# Patient Record
Sex: MALE | Race: BLACK OR AFRICAN AMERICAN | NOT HISPANIC OR LATINO | Employment: FULL TIME | URBAN - METROPOLITAN AREA
[De-identification: names, ages, dates, MRNs, and addresses within clinical notes are randomized per-mention and may not be internally consistent; named-entity substitution may affect disease eponyms.]

---

## 2021-07-29 DIAGNOSIS — U07.1 COVID-19 VIRUS DETECTED: ICD-10-CM

## 2021-08-01 ENCOUNTER — NURSE TRIAGE (OUTPATIENT)
Dept: ADMINISTRATIVE | Facility: CLINIC | Age: 31
End: 2021-08-01

## 2021-08-02 ENCOUNTER — NURSE TRIAGE (OUTPATIENT)
Dept: ADMINISTRATIVE | Facility: CLINIC | Age: 31
End: 2021-08-02

## 2021-08-03 ENCOUNTER — NURSE TRIAGE (OUTPATIENT)
Dept: ADMINISTRATIVE | Facility: CLINIC | Age: 31
End: 2021-08-03

## 2021-08-06 ENCOUNTER — NURSE TRIAGE (OUTPATIENT)
Dept: ADMINISTRATIVE | Facility: CLINIC | Age: 31
End: 2021-08-06

## 2021-08-07 ENCOUNTER — NURSE TRIAGE (OUTPATIENT)
Dept: ADMINISTRATIVE | Facility: CLINIC | Age: 31
End: 2021-08-07

## 2021-08-08 ENCOUNTER — NURSE TRIAGE (OUTPATIENT)
Dept: ADMINISTRATIVE | Facility: CLINIC | Age: 31
End: 2021-08-08

## 2021-08-09 ENCOUNTER — NURSE TRIAGE (OUTPATIENT)
Dept: ADMINISTRATIVE | Facility: CLINIC | Age: 31
End: 2021-08-09

## 2022-09-14 ENCOUNTER — OFFICE VISIT (OUTPATIENT)
Dept: UROLOGY | Facility: CLINIC | Age: 32
End: 2022-09-14
Payer: COMMERCIAL

## 2022-09-14 VITALS
HEIGHT: 67 IN | DIASTOLIC BLOOD PRESSURE: 79 MMHG | BODY MASS INDEX: 25.01 KG/M2 | SYSTOLIC BLOOD PRESSURE: 125 MMHG | WEIGHT: 159.38 LBS | HEART RATE: 68 BPM

## 2022-09-14 DIAGNOSIS — N50.0 ATROPHY OF TESTIS: ICD-10-CM

## 2022-09-14 DIAGNOSIS — Z76.89 ESTABLISHING CARE WITH NEW DOCTOR, ENCOUNTER FOR: Primary | ICD-10-CM

## 2022-09-14 LAB
BILIRUB SERPL-MCNC: NORMAL MG/DL
BLOOD URINE, POC: NORMAL
CLARITY, POC UA: CLEAR
COLOR, POC UA: NORMAL
GLUCOSE UR QL STRIP: NORMAL
KETONES UR QL STRIP: NORMAL
LEUKOCYTE ESTERASE URINE, POC: NORMAL
NITRITE, POC UA: NORMAL
PH, POC UA: 7
PROTEIN, POC: NORMAL
SPECIFIC GRAVITY, POC UA: 1.02
UROBILINOGEN, POC UA: NORMAL

## 2022-09-14 PROCEDURE — 99999 PR PBB SHADOW E&M-EST. PATIENT-LVL III: ICD-10-PCS | Mod: PBBFAC,,, | Performed by: NURSE PRACTITIONER

## 2022-09-14 PROCEDURE — 99204 PR OFFICE/OUTPT VISIT, NEW, LEVL IV, 45-59 MIN: ICD-10-PCS | Mod: S$GLB,,, | Performed by: NURSE PRACTITIONER

## 2022-09-14 PROCEDURE — 1160F RVW MEDS BY RX/DR IN RCRD: CPT | Mod: CPTII,S$GLB,, | Performed by: NURSE PRACTITIONER

## 2022-09-14 PROCEDURE — 3074F PR MOST RECENT SYSTOLIC BLOOD PRESSURE < 130 MM HG: ICD-10-PCS | Mod: CPTII,S$GLB,, | Performed by: NURSE PRACTITIONER

## 2022-09-14 PROCEDURE — 81002 URINALYSIS NONAUTO W/O SCOPE: CPT | Mod: S$GLB,,, | Performed by: NURSE PRACTITIONER

## 2022-09-14 PROCEDURE — 3078F DIAST BP <80 MM HG: CPT | Mod: CPTII,S$GLB,, | Performed by: NURSE PRACTITIONER

## 2022-09-14 PROCEDURE — 3008F BODY MASS INDEX DOCD: CPT | Mod: CPTII,S$GLB,, | Performed by: NURSE PRACTITIONER

## 2022-09-14 PROCEDURE — 99204 OFFICE O/P NEW MOD 45 MIN: CPT | Mod: S$GLB,,, | Performed by: NURSE PRACTITIONER

## 2022-09-14 PROCEDURE — 1160F PR REVIEW ALL MEDS BY PRESCRIBER/CLIN PHARMACIST DOCUMENTED: ICD-10-PCS | Mod: CPTII,S$GLB,, | Performed by: NURSE PRACTITIONER

## 2022-09-14 PROCEDURE — 3074F SYST BP LT 130 MM HG: CPT | Mod: CPTII,S$GLB,, | Performed by: NURSE PRACTITIONER

## 2022-09-14 PROCEDURE — 81002 POCT URINE DIPSTICK WITHOUT MICROSCOPE: ICD-10-PCS | Mod: S$GLB,,, | Performed by: NURSE PRACTITIONER

## 2022-09-14 PROCEDURE — 1159F MED LIST DOCD IN RCRD: CPT | Mod: CPTII,S$GLB,, | Performed by: NURSE PRACTITIONER

## 2022-09-14 PROCEDURE — 3008F PR BODY MASS INDEX (BMI) DOCUMENTED: ICD-10-PCS | Mod: CPTII,S$GLB,, | Performed by: NURSE PRACTITIONER

## 2022-09-14 PROCEDURE — 99999 PR PBB SHADOW E&M-EST. PATIENT-LVL III: CPT | Mod: PBBFAC,,, | Performed by: NURSE PRACTITIONER

## 2022-09-14 PROCEDURE — 1159F PR MEDICATION LIST DOCUMENTED IN MEDICAL RECORD: ICD-10-PCS | Mod: CPTII,S$GLB,, | Performed by: NURSE PRACTITIONER

## 2022-09-14 PROCEDURE — 3078F PR MOST RECENT DIASTOLIC BLOOD PRESSURE < 80 MM HG: ICD-10-PCS | Mod: CPTII,S$GLB,, | Performed by: NURSE PRACTITIONER

## 2022-09-14 RX ORDER — TADALAFIL 5 MG/1
5 TABLET ORAL DAILY
Qty: 30 TABLET | Refills: 12 | Status: SHIPPED | OUTPATIENT
Start: 2022-09-14 | End: 2024-01-31 | Stop reason: SDUPTHER

## 2022-09-14 NOTE — PROGRESS NOTES
"[facility] Note  Staff: ANNIE Borrero-C    PCP: MD Rosa    Chief Complaint: Atrophy of testis    Subjective:        HPI: Tomi Herrera is a 32 y.o. male NEW PT presents today for evaluation of treatment of small testicles.    Pt states he was initially seeing Dr. Pablo Gutierrez in [place], [place] for treatment of "small testicles" with daily Cialis 5 mg.  But since last ov, MD has retired and pt currently looking for another MD at this time for his refills.    [facility]-primary care    STDs in past: No  Vasectomy: none  No hx of gross hematuria  No hx of blood in semen.    No Family hx of  cancers at this time.     JEAN CARLOS Score of 16 today:  Mild-moderate ED  1.3  2.3  3.2  4.4  5.4    REVIEW OF SYSTEMS:  A comprehensive 10 system review was performed and is negative except as noted above in HPI    PMHx:  History reviewed. No pertinent past medical history.  Kidney stones: No  Wears glasses    PSHx:  History reviewed. No pertinent surgical history.    Allergies:  Patient has no known allergies.    Medications: reviewed   Objective:     Vitals:    09/14/22 1410   BP: 125/79   Pulse: 68     General:WDWN in NAD  Eyes: PERRLA, normal conjunctiva  Respiratory: no increased work on breathing, clear to auscultation  Cardiovascular: regular rate and rhythm. No obvious extremity edema.  GI: palpation of masses. No tenderness. No hepatosplenomegaly to palpation.  Musculoskeletal: normal range of motion of bilateral upper extremities. Normal muscle strength and tone.  Skin: no obvious rashes or lesions. No tightening of skin noted.  Neurologic: CN grossly normal. Normal sensation.   Psychiatric: awake, alert and oriented x 3. Mood and affect normal. Cooperative.     Exam performed by me during ov today:  No scrotal rashes, cysts or lesions  Epididymis normal in size, no tenderness  Atrophy of Testes observed, descended normal and size, equal size bilaterally, no masses  Urethral " meatus normal without discharge  Penis is not circumcised,      Assessment:       1. Establishing care with new doctor, encounter for    2. Atrophy of testis          Plan:     Records Release form signed and faxed to former Dr. Pablo Gutierrez's office to obtain past medical records for review at this time.    Cialis 5 mg refilled to pt today, but to only take as needed.    F/UP--once we receive and review pt's previous records related to his  issues, we will contact him regarding a f/up appointment.  If we are unable to obtain records from previous MD, then RTC in one year for recheck.      Chloe Rai, JENNIFERP-C

## 2023-08-30 ENCOUNTER — OFFICE VISIT (OUTPATIENT)
Dept: FAMILY MEDICINE | Facility: CLINIC | Age: 33
End: 2023-08-30
Payer: COMMERCIAL

## 2023-08-30 ENCOUNTER — LAB VISIT (OUTPATIENT)
Dept: LAB | Facility: HOSPITAL | Age: 33
End: 2023-08-30
Attending: FAMILY MEDICINE
Payer: COMMERCIAL

## 2023-08-30 VITALS
HEART RATE: 85 BPM | OXYGEN SATURATION: 99 % | TEMPERATURE: 98 F | RESPIRATION RATE: 18 BRPM | BODY MASS INDEX: 24.8 KG/M2 | DIASTOLIC BLOOD PRESSURE: 74 MMHG | SYSTOLIC BLOOD PRESSURE: 110 MMHG | WEIGHT: 158 LBS | HEIGHT: 67 IN

## 2023-08-30 DIAGNOSIS — Z00.00 PREVENTATIVE HEALTH CARE: ICD-10-CM

## 2023-08-30 DIAGNOSIS — L73.9 FOLLICULITIS: Primary | ICD-10-CM

## 2023-08-30 DIAGNOSIS — R06.02 SOB (SHORTNESS OF BREATH): ICD-10-CM

## 2023-08-30 DIAGNOSIS — I51.7 ATRIAL ENLARGEMENT, LEFT: ICD-10-CM

## 2023-08-30 LAB
ALBUMIN SERPL BCP-MCNC: 4.3 G/DL (ref 3.5–5.2)
ALP SERPL-CCNC: 47 U/L (ref 55–135)
ALT SERPL W/O P-5'-P-CCNC: 24 U/L (ref 10–44)
ANION GAP SERPL CALC-SCNC: 7 MMOL/L (ref 8–16)
AST SERPL-CCNC: 26 U/L (ref 10–40)
BASOPHILS # BLD AUTO: 0.04 K/UL (ref 0–0.2)
BASOPHILS NFR BLD: 0.9 % (ref 0–1.9)
BILIRUB SERPL-MCNC: 0.8 MG/DL (ref 0.1–1)
BUN SERPL-MCNC: 14 MG/DL (ref 6–20)
CALCIUM SERPL-MCNC: 9.1 MG/DL (ref 8.7–10.5)
CHLORIDE SERPL-SCNC: 104 MMOL/L (ref 95–110)
CHOLEST SERPL-MCNC: 179 MG/DL (ref 120–199)
CHOLEST/HDLC SERPL: 3.4 {RATIO} (ref 2–5)
CO2 SERPL-SCNC: 26 MMOL/L (ref 23–29)
CREAT SERPL-MCNC: 1 MG/DL (ref 0.5–1.4)
DIFFERENTIAL METHOD: ABNORMAL
EOSINOPHIL # BLD AUTO: 0.1 K/UL (ref 0–0.5)
EOSINOPHIL NFR BLD: 2.4 % (ref 0–8)
ERYTHROCYTE [DISTWIDTH] IN BLOOD BY AUTOMATED COUNT: 12.2 % (ref 11.5–14.5)
EST. GFR  (NO RACE VARIABLE): >60 ML/MIN/1.73 M^2
GLUCOSE SERPL-MCNC: 85 MG/DL (ref 70–110)
HCT VFR BLD AUTO: 46.4 % (ref 40–54)
HDLC SERPL-MCNC: 52 MG/DL (ref 40–75)
HDLC SERPL: 29.1 % (ref 20–50)
HGB BLD-MCNC: 14.8 G/DL (ref 14–18)
IMM GRANULOCYTES # BLD AUTO: 0.01 K/UL (ref 0–0.04)
IMM GRANULOCYTES NFR BLD AUTO: 0.2 % (ref 0–0.5)
LDLC SERPL CALC-MCNC: 120.8 MG/DL (ref 63–159)
LYMPHOCYTES # BLD AUTO: 2 K/UL (ref 1–4.8)
LYMPHOCYTES NFR BLD: 42 % (ref 18–48)
MCH RBC QN AUTO: 28.4 PG (ref 27–31)
MCHC RBC AUTO-ENTMCNC: 31.9 G/DL (ref 32–36)
MCV RBC AUTO: 89 FL (ref 82–98)
MONOCYTES # BLD AUTO: 0.4 K/UL (ref 0.3–1)
MONOCYTES NFR BLD: 8.6 % (ref 4–15)
NEUTROPHILS # BLD AUTO: 2.2 K/UL (ref 1.8–7.7)
NEUTROPHILS NFR BLD: 45.9 % (ref 38–73)
NONHDLC SERPL-MCNC: 127 MG/DL
NRBC BLD-RTO: 0 /100 WBC
PLATELET # BLD AUTO: 251 K/UL (ref 150–450)
PMV BLD AUTO: 10.9 FL (ref 9.2–12.9)
POTASSIUM SERPL-SCNC: 3.9 MMOL/L (ref 3.5–5.1)
PROT SERPL-MCNC: 7.8 G/DL (ref 6–8.4)
RBC # BLD AUTO: 5.21 M/UL (ref 4.6–6.2)
SODIUM SERPL-SCNC: 137 MMOL/L (ref 136–145)
TRIGL SERPL-MCNC: 31 MG/DL (ref 30–150)
WBC # BLD AUTO: 4.67 K/UL (ref 3.9–12.7)

## 2023-08-30 PROCEDURE — 1159F PR MEDICATION LIST DOCUMENTED IN MEDICAL RECORD: ICD-10-PCS | Mod: CPTII,S$GLB,, | Performed by: FAMILY MEDICINE

## 2023-08-30 PROCEDURE — 3074F SYST BP LT 130 MM HG: CPT | Mod: CPTII,S$GLB,, | Performed by: FAMILY MEDICINE

## 2023-08-30 PROCEDURE — 3008F BODY MASS INDEX DOCD: CPT | Mod: CPTII,S$GLB,, | Performed by: FAMILY MEDICINE

## 2023-08-30 PROCEDURE — 99214 OFFICE O/P EST MOD 30 MIN: CPT | Mod: S$GLB,,, | Performed by: FAMILY MEDICINE

## 2023-08-30 PROCEDURE — 99214 PR OFFICE/OUTPT VISIT, EST, LEVL IV, 30-39 MIN: ICD-10-PCS | Mod: S$GLB,,, | Performed by: FAMILY MEDICINE

## 2023-08-30 PROCEDURE — 36415 COLL VENOUS BLD VENIPUNCTURE: CPT | Performed by: FAMILY MEDICINE

## 2023-08-30 PROCEDURE — 86803 HEPATITIS C AB TEST: CPT | Performed by: FAMILY MEDICINE

## 2023-08-30 PROCEDURE — 80061 LIPID PANEL: CPT | Performed by: FAMILY MEDICINE

## 2023-08-30 PROCEDURE — 3074F PR MOST RECENT SYSTOLIC BLOOD PRESSURE < 130 MM HG: ICD-10-PCS | Mod: CPTII,S$GLB,, | Performed by: FAMILY MEDICINE

## 2023-08-30 PROCEDURE — 80053 COMPREHEN METABOLIC PANEL: CPT | Performed by: FAMILY MEDICINE

## 2023-08-30 PROCEDURE — 3008F PR BODY MASS INDEX (BMI) DOCUMENTED: ICD-10-PCS | Mod: CPTII,S$GLB,, | Performed by: FAMILY MEDICINE

## 2023-08-30 PROCEDURE — 87389 HIV-1 AG W/HIV-1&-2 AB AG IA: CPT | Performed by: FAMILY MEDICINE

## 2023-08-30 PROCEDURE — 1159F MED LIST DOCD IN RCRD: CPT | Mod: CPTII,S$GLB,, | Performed by: FAMILY MEDICINE

## 2023-08-30 PROCEDURE — 85025 COMPLETE CBC W/AUTO DIFF WBC: CPT | Performed by: FAMILY MEDICINE

## 2023-08-30 PROCEDURE — 3078F DIAST BP <80 MM HG: CPT | Mod: CPTII,S$GLB,, | Performed by: FAMILY MEDICINE

## 2023-08-30 PROCEDURE — 3078F PR MOST RECENT DIASTOLIC BLOOD PRESSURE < 80 MM HG: ICD-10-PCS | Mod: CPTII,S$GLB,, | Performed by: FAMILY MEDICINE

## 2023-08-30 RX ORDER — MUPIROCIN 20 MG/G
OINTMENT TOPICAL 3 TIMES DAILY
Qty: 30 G | Refills: 2 | Status: SHIPPED | OUTPATIENT
Start: 2023-08-30

## 2023-08-30 NOTE — PROGRESS NOTES
Subjective:       Patient ID: Tomi Herrera is a 33 y.o. male.    Chief Complaint: Establish Care (Immature granulocytes high)      Patient comes in today for consultation with abnormal labs.  Patient has been having some tenderness in lumps in the right lower quadrant.  Patient had concerns about L elevated immature granulocytes was reviewed his entire labs and showed that this was an inflammatory or stress response and not to worry this was done in late April when the allergies were very thick.  Will repeat a CBC to confirm          Allergies and Medications:   Review of patient's allergies indicates:  No Known Allergies  Current Outpatient Medications   Medication Sig Dispense Refill    tadalafiL (CIALIS) 5 MG tablet Take 1 tablet (5 mg total) by mouth once daily. 30 tablet 12    aluminum & magnesium hydroxide-simethicone (MYLANTA MAXIMUM STRENGTH) 400-400-40 mg/5 mL suspension Take 15 mLs by mouth every 6 (six) hours as needed for Indigestion. (Patient not taking: Reported on 8/30/2023) 100 mL 0    famotidine (PEPCID) 20 MG tablet Take 1 tablet (20 mg total) by mouth 2 (two) times daily. for 14 days (Patient not taking: Reported on 8/30/2023) 28 tablet 0    mupirocin (BACTROBAN) 2 % ointment Apply topically 3 (three) times daily. 30 g 2    ondansetron (ZOFRAN-ODT) 4 MG TbDL Take 1-2 tablets (4-8 mg total) by mouth every 8 (eight) hours as needed (Nausea/Vomiting). (Patient not taking: Reported on 8/30/2023) 20 tablet 0    pantoprazole (PROTONIX) 20 MG tablet Take 1 tablet (20 mg total) by mouth 2 (two) times daily before meals. for 14 days (Patient not taking: Reported on 8/30/2023) 28 tablet 0     No current facility-administered medications for this visit.       Family History:   History reviewed. No pertinent family history.    Social History:   Social History     Socioeconomic History    Marital status: Single   Tobacco Use    Smoking status: Never    Smokeless tobacco: Never   Substance and Sexual Activity     Alcohol use: Yes     Comment: occ    Drug use: No    Sexual activity: Yes     Partners: Female       Review of Systems   Respiratory:  Positive for shortness of breath (Even when awakening at night). Negative for apnea, cough, choking, chest tightness, wheezing and stridor.        Objective:     Vitals:    08/30/23 0827   BP: 110/74   Pulse: 85   Resp: 18   Temp: 98.4 °F (36.9 °C)        Physical Exam  Vitals and nursing note reviewed.   Constitutional:       Appearance: He is well-developed. He is not diaphoretic.   HENT:      Head: Normocephalic.   Eyes:      Conjunctiva/sclera: Conjunctivae normal.      Pupils: Pupils are equal, round, and reactive to light.   Cardiovascular:      Rate and Rhythm: Normal rate and regular rhythm.      Heart sounds: Murmur (grade 2 of 6 mid systolic murmur at the left sternal border.) heard.      No friction rub. No gallop.   Pulmonary:      Effort: Pulmonary effort is normal. No respiratory distress.      Breath sounds: Normal breath sounds. No stridor. No wheezing, rhonchi or rales.   Chest:      Chest wall: No tenderness.   Abdominal:      Hernia: There is no hernia in the left inguinal area or right inguinal area.   Genitourinary:     Pubic Area: No rash or pubic lice.       Penis: Circumcised. No phimosis, paraphimosis, hypospadias, erythema, tenderness, discharge, swelling or lesions.       Testes: Normal. Cremasteric reflex is present.         Right: Mass, tenderness, swelling, testicular hydrocele or varicocele not present. Right testis is descended. Cremasteric reflex is present.          Left: Mass, tenderness, swelling, testicular hydrocele or varicocele not present. Left testis is descended. Cremasteric reflex is present.       Epididymis:      Right: Normal. Not inflamed or enlarged. No mass or tenderness.      Left: Normal. Not inflamed or enlarged. No mass or tenderness.      Sathish stage (genital): 5.   Musculoskeletal:         General: No swelling, tenderness or  deformity.      Right lower leg: No edema.   Lymphadenopathy:      Lower Body: No right inguinal adenopathy. No left inguinal adenopathy.   Skin:     General: Skin is warm and dry.   Psychiatric:         Behavior: Behavior normal.         Thought Content: Thought content normal.         Judgment: Judgment normal.       EKG from earlier this year shows some left atrial enlargement and early repolarization pattern probably a normal variant.  Assessment:       1. Folliculitis    2. Preventative health care    3. Atrial enlargement, left    4. SOB (shortness of breath)        Plan:       Tomi was seen today for establish care.    Diagnoses and all orders for this visit:    Folliculitis  -     mupirocin (BACTROBAN) 2 % ointment; Apply topically 3 (three) times daily.    Preventative health care- patient is overdue for routine care  -     Hepatitis C Antibody; Future  -     HIV 1/2 Ag/Ab (4th Gen) w/Refl; Future  -     CBC auto differential; Future  -     Lipid Panel; Future  -     Comprehensive Metabolic Panel; Future    Atrial enlargement, left-comparing his old EKGs this appears to be chronic and not a new finding the new finding was only under computer interpretation.  -     Echo; Future  -     Exercise Stress - EKG; Future    SOB (shortness of breath) this can occur with exertion or at rest needs evaluation  -     Exercise Stress - EKG; Future         Follow up in about 1 month (around 9/30/2023) for annual.

## 2023-08-31 LAB
HCV AB S/CO SERPL IA: NON REACTIVE
HIV 1+2 AB+HIV1 P24 AG SERPL QL IA: NON REACTIVE

## 2023-09-11 ENCOUNTER — PATIENT MESSAGE (OUTPATIENT)
Dept: FAMILY MEDICINE | Facility: CLINIC | Age: 33
End: 2023-09-11

## 2023-09-12 ENCOUNTER — TELEPHONE (OUTPATIENT)
Dept: FAMILY MEDICINE | Facility: CLINIC | Age: 33
End: 2023-09-12

## 2023-09-12 NOTE — TELEPHONE ENCOUNTER
Patient message    Hey Mr riddle I was looking at my recent ecg and I saw baseline wander limit interpretation whats the meaning of that does that mean the ecg wasnt accurate?

## 2023-09-14 ENCOUNTER — PATIENT MESSAGE (OUTPATIENT)
Dept: FAMILY MEDICINE | Facility: CLINIC | Age: 33
End: 2023-09-14

## 2023-09-14 ENCOUNTER — TELEPHONE (OUTPATIENT)
Dept: FAMILY MEDICINE | Facility: CLINIC | Age: 33
End: 2023-09-14

## 2023-09-15 ENCOUNTER — TELEPHONE (OUTPATIENT)
Dept: CARDIOLOGY | Facility: HOSPITAL | Age: 33
End: 2023-09-15

## 2023-09-15 NOTE — TELEPHONE ENCOUNTER
Patient advised, test will be at [facility] ([address] [facility]).   Will need to register on the first floor at the main entrance.   Patient advised that arrival time is 9:15am.  Patient advised, may take his medications prior to testing if you need to.  Advised if he needs to eat to take his medications, please keep it light, like toast and juice.    Patient advised to avoid all caffeine 12 hours prior to testing.  This includes decaf tea and coffee.    Wear comfortable clothing.   No lotions, oils, or powders to the upper chest area. May wear deodorant.    Patient verbalizes understanding of instructions.

## 2023-09-18 ENCOUNTER — HOSPITAL ENCOUNTER (OUTPATIENT)
Dept: CARDIOLOGY | Facility: HOSPITAL | Age: 33
Discharge: HOME | End: 2023-09-18
Attending: FAMILY MEDICINE
Payer: COMMERCIAL

## 2023-09-18 DIAGNOSIS — R06.02 SOB (SHORTNESS OF BREATH): ICD-10-CM

## 2023-09-18 DIAGNOSIS — I51.7 ATRIAL ENLARGEMENT, LEFT: ICD-10-CM

## 2023-09-18 LAB
CV STRESS BASE HR: 92 BPM
DIASTOLIC BLOOD PRESSURE: 88 MMHG
OHS CV CPX 1 MINUTE RECOVERY HEART RATE: 114 BPM
OHS CV CPX 85 PERCENT MAX PREDICTED HEART RATE MALE: 161
OHS CV CPX ESTIMATED METS: 12
OHS CV CPX MAX PREDICTED HEART RATE: 189
OHS CV CPX PATIENT IS FEMALE: 0
OHS CV CPX PATIENT IS MALE: 1
OHS CV CPX PEAK DIASTOLIC BLOOD PRESSURE: 88 MMHG
OHS CV CPX PEAK HEAR RATE: 161 BPM
OHS CV CPX PEAK RATE PRESSURE PRODUCT: NORMAL
OHS CV CPX PEAK SYSTOLIC BLOOD PRESSURE: 162 MMHG
OHS CV CPX PERCENT MAX PREDICTED HEART RATE ACHIEVED: 85
OHS CV CPX RATE PRESSURE PRODUCT PRESENTING: NORMAL
STRESS ECHO POST EXERCISE DUR MIN: 11 MINUTES
STRESS ECHO POST EXERCISE DUR SEC: 23 SECONDS
SYSTOLIC BLOOD PRESSURE: 134 MMHG

## 2023-09-18 PROCEDURE — 93016 EXERCISE STRESS - EKG (CUPID ONLY): ICD-10-PCS | Mod: ,,, | Performed by: NURSE PRACTITIONER

## 2023-09-18 PROCEDURE — 93018 EXERCISE STRESS - EKG (CUPID ONLY): ICD-10-PCS | Mod: ,,, | Performed by: INTERNAL MEDICINE

## 2023-09-18 PROCEDURE — 93016 CV STRESS TEST SUPVJ ONLY: CPT | Mod: ,,, | Performed by: NURSE PRACTITIONER

## 2023-09-18 PROCEDURE — 93018 CV STRESS TEST I&R ONLY: CPT | Mod: ,,, | Performed by: INTERNAL MEDICINE

## 2023-09-18 PROCEDURE — 93017 CV STRESS TEST TRACING ONLY: CPT

## 2023-09-19 ENCOUNTER — PATIENT MESSAGE (OUTPATIENT)
Dept: FAMILY MEDICINE | Facility: CLINIC | Age: 33
End: 2023-09-19

## 2023-09-19 ENCOUNTER — TELEPHONE (OUTPATIENT)
Dept: FAMILY MEDICINE | Facility: CLINIC | Age: 33
End: 2023-09-19

## 2023-09-19 NOTE — TELEPHONE ENCOUNTER
----- Message from Jaguar Voss MD sent at 9/18/2023  4:09 PM CDT -----  Stress test is negative return to clinic for follow-up as scheduled.

## 2023-10-09 ENCOUNTER — HOSPITAL ENCOUNTER (OUTPATIENT)
Dept: CARDIOLOGY | Facility: HOSPITAL | Age: 33
Discharge: HOME | End: 2023-10-09
Attending: FAMILY MEDICINE
Payer: COMMERCIAL

## 2023-10-09 VITALS — HEIGHT: 67 IN | BODY MASS INDEX: 24.8 KG/M2 | WEIGHT: 158 LBS

## 2023-10-09 DIAGNOSIS — I51.7 ATRIAL ENLARGEMENT, LEFT: ICD-10-CM

## 2023-10-09 LAB
AORTIC ROOT ANNULUS: 3.1 CM
AORTIC VALVE CUSP SEPERATION: 2.1 CM
AV INDEX (PROSTH): 0.88
AV MEAN GRADIENT: 4 MMHG
AV PEAK GRADIENT: 9 MMHG
AV VALVE AREA BY VELOCITY RATIO: 2.42 CM²
AV VALVE AREA: 2.78 CM²
AV VELOCITY RATIO: 0.77
BSA FOR ECHO PROCEDURE: 1.84 M2
CV ECHO LV RWT: 0.36 CM
DOP CALC AO PEAK VEL: 1.48 M/S
DOP CALC AO VTI: 30.2 CM
DOP CALC LVOT AREA: 3.1 CM2
DOP CALC LVOT DIAMETER: 2 CM
DOP CALC LVOT PEAK VEL: 1.14 M/S
DOP CALC LVOT STROKE VOLUME: 83.84 CM3
DOP CALCLVOT PEAK VEL VTI: 26.7 CM
E WAVE DECELERATION TIME: 153 MSEC
E/A RATIO: 1.91
E/E' RATIO: 6.97 M/S
ECHO LV POSTERIOR WALL: 0.89 CM (ref 0.6–1.1)
FRACTIONAL SHORTENING: 34 % (ref 28–44)
INTERVENTRICULAR SEPTUM: 0.98 CM (ref 0.6–1.1)
IVRT: 84 MSEC
LEFT ATRIUM SIZE: 3.1 CM
LEFT INTERNAL DIMENSION IN SYSTOLE: 3.24 CM (ref 2.1–4)
LEFT VENTRICLE DIASTOLIC VOLUME INDEX: 61.2 ML/M2
LEFT VENTRICLE DIASTOLIC VOLUME: 112 ML
LEFT VENTRICLE MASS INDEX: 88 G/M2
LEFT VENTRICLE SYSTOLIC VOLUME INDEX: 23.1 ML/M2
LEFT VENTRICLE SYSTOLIC VOLUME: 42.2 ML
LEFT VENTRICULAR INTERNAL DIMENSION IN DIASTOLE: 4.89 CM (ref 3.5–6)
LEFT VENTRICULAR MASS: 160.33 G
LV LATERAL E/E' RATIO: 5.05 M/S
LV SEPTAL E/E' RATIO: 11.22 M/S
LVOT MG: 3 MMHG
LVOT MV: 0.71 CM/S
MV PEAK A VEL: 0.53 M/S
MV PEAK E VEL: 1.01 M/S
MV STENOSIS PRESSURE HALF TIME: 49 MS
MV VALVE AREA P 1/2 METHOD: 4.49 CM2
PISA TR MAX VEL: 2.12 M/S
PV MV: 0.75 M/S
PV PEAK GRADIENT: 5 MMHG
PV PEAK VELOCITY: 1.11 M/S
RA PRESSURE ESTIMATED: 3 MMHG
RIGHT VENTRICULAR END-DIASTOLIC DIMENSION: 1.74 CM
RV TB RVSP: 5 MMHG
TDI LATERAL: 0.2 M/S
TDI SEPTAL: 0.09 M/S
TDI: 0.15 M/S
TR MAX PG: 18 MMHG
TV REST PULMONARY ARTERY PRESSURE: 21 MMHG
Z-SCORE OF LEFT VENTRICULAR DIMENSION IN END DIASTOLE: -0.36
Z-SCORE OF LEFT VENTRICULAR DIMENSION IN END SYSTOLE: 0.27

## 2023-10-09 PROCEDURE — 93306 TTE W/DOPPLER COMPLETE: CPT

## 2023-10-09 PROCEDURE — 93306 TTE W/DOPPLER COMPLETE: CPT | Mod: 26,,, | Performed by: GENERAL PRACTICE

## 2023-10-09 PROCEDURE — 93306 ECHO (CUPID ONLY): ICD-10-PCS | Mod: 26,,, | Performed by: GENERAL PRACTICE

## 2023-10-11 ENCOUNTER — OFFICE VISIT (OUTPATIENT)
Dept: FAMILY MEDICINE | Facility: CLINIC | Age: 33
End: 2023-10-11
Payer: COMMERCIAL

## 2023-10-11 VITALS
DIASTOLIC BLOOD PRESSURE: 80 MMHG | RESPIRATION RATE: 18 BRPM | TEMPERATURE: 98 F | HEIGHT: 67 IN | HEART RATE: 53 BPM | SYSTOLIC BLOOD PRESSURE: 126 MMHG | BODY MASS INDEX: 25.11 KG/M2 | WEIGHT: 160 LBS | OXYGEN SATURATION: 99 %

## 2023-10-11 DIAGNOSIS — F41.9 ANXIETY: ICD-10-CM

## 2023-10-11 DIAGNOSIS — K12.0 ORAL APHTHOUS ULCER: Primary | ICD-10-CM

## 2023-10-11 PROCEDURE — 99213 PR OFFICE/OUTPT VISIT, EST, LEVL III, 20-29 MIN: ICD-10-PCS | Mod: S$GLB,,, | Performed by: FAMILY MEDICINE

## 2023-10-11 PROCEDURE — 1159F PR MEDICATION LIST DOCUMENTED IN MEDICAL RECORD: ICD-10-PCS | Mod: CPTII,S$GLB,, | Performed by: FAMILY MEDICINE

## 2023-10-11 PROCEDURE — 99213 OFFICE O/P EST LOW 20 MIN: CPT | Mod: S$GLB,,, | Performed by: FAMILY MEDICINE

## 2023-10-11 PROCEDURE — 3008F BODY MASS INDEX DOCD: CPT | Mod: CPTII,S$GLB,, | Performed by: FAMILY MEDICINE

## 2023-10-11 PROCEDURE — 3074F SYST BP LT 130 MM HG: CPT | Mod: CPTII,S$GLB,, | Performed by: FAMILY MEDICINE

## 2023-10-11 PROCEDURE — 1159F MED LIST DOCD IN RCRD: CPT | Mod: CPTII,S$GLB,, | Performed by: FAMILY MEDICINE

## 2023-10-11 PROCEDURE — 3079F DIAST BP 80-89 MM HG: CPT | Mod: CPTII,S$GLB,, | Performed by: FAMILY MEDICINE

## 2023-10-11 PROCEDURE — 3074F PR MOST RECENT SYSTOLIC BLOOD PRESSURE < 130 MM HG: ICD-10-PCS | Mod: CPTII,S$GLB,, | Performed by: FAMILY MEDICINE

## 2023-10-11 PROCEDURE — 3008F PR BODY MASS INDEX (BMI) DOCUMENTED: ICD-10-PCS | Mod: CPTII,S$GLB,, | Performed by: FAMILY MEDICINE

## 2023-10-11 PROCEDURE — 3079F PR MOST RECENT DIASTOLIC BLOOD PRESSURE 80-89 MM HG: ICD-10-PCS | Mod: CPTII,S$GLB,, | Performed by: FAMILY MEDICINE

## 2023-10-11 RX ORDER — TRIAMCINOLONE ACETONIDE 1 MG/G
PASTE DENTAL 2 TIMES DAILY
Qty: 10 G | Refills: 0 | Status: SHIPPED | OUTPATIENT
Start: 2023-10-11 | End: 2023-11-10

## 2023-10-11 RX ORDER — BUSPIRONE HYDROCHLORIDE 5 MG/1
5 TABLET ORAL 2 TIMES DAILY
Qty: 60 TABLET | Refills: 11 | Status: SHIPPED | OUTPATIENT
Start: 2023-10-11 | End: 2024-10-10

## 2023-10-11 NOTE — PROGRESS NOTES
Subjective:       Patient ID: Tomi Herrera is a 33 y.o. male.    Chief Complaint: stress test results and Mouth Lesions (Left side since Saturday; ENT prescribed magic mouthwash, Pt states it provided no relief)      Patient is here for follow-up on his chest pain did have a stress test which was completely normal with good exercise tolerance and ejection fraction of 60% his echo confirm this he did have some very mild tricuspid regurg and indeterminate diastolic dysfunction.  Lab Results       Component                Value               Date                       WBC                      4.67                08/30/2023                 HGB                      14.8                08/30/2023                 HCT                      46.4                08/30/2023                 PLT                      251                 08/30/2023                 CHOL                     179                 08/30/2023                 TRIG                     31                  08/30/2023                 HDL                      52                  08/30/2023                 ALT                      24                  08/30/2023                 AST                      26                  08/30/2023                 NA                       137                 08/30/2023                 K                        3.9                 08/30/2023                 CL                       104                 08/30/2023                 CREATININE               1.0                 08/30/2023                 BUN                      14                  08/30/2023                 CO2                      26                  08/30/2023                 TSH                      2.37                12/12/2018                 PSA                      0.35                11/16/2019            Results reviewed with the patient.    Mouth Lesions   The current episode started 5 to 7 days ago. The onset was sudden. The problem has been unchanged. The  problem is mild. Associated symptoms include mouth sores. Pertinent negatives include no orthopnea and no fever.       Allergies and Medications:   Review of patient's allergies indicates:  No Known Allergies  Current Outpatient Medications   Medication Sig Dispense Refill    tadalafiL (CIALIS) 5 MG tablet Take 1 tablet (5 mg total) by mouth once daily. 30 tablet 12    aluminum & magnesium hydroxide-simethicone (MYLANTA MAXIMUM STRENGTH) 400-400-40 mg/5 mL suspension Take 15 mLs by mouth every 6 (six) hours as needed for Indigestion. (Patient not taking: Reported on 8/30/2023) 100 mL 0    busPIRone (BUSPAR) 5 MG Tab Take 1 tablet (5 mg total) by mouth 2 (two) times daily. 60 tablet 11    famotidine (PEPCID) 20 MG tablet Take 1 tablet (20 mg total) by mouth 2 (two) times daily. for 14 days (Patient not taking: Reported on 8/30/2023) 28 tablet 0    mupirocin (BACTROBAN) 2 % ointment Apply topically 3 (three) times daily. (Patient not taking: Reported on 10/11/2023) 30 g 2    ondansetron (ZOFRAN-ODT) 4 MG TbDL Take 1-2 tablets (4-8 mg total) by mouth every 8 (eight) hours as needed (Nausea/Vomiting). (Patient not taking: Reported on 8/30/2023) 20 tablet 0    pantoprazole (PROTONIX) 20 MG tablet Take 1 tablet (20 mg total) by mouth 2 (two) times daily before meals. for 14 days (Patient not taking: Reported on 8/30/2023) 28 tablet 0    triamcinolone acetonide 0.1% (KENALOG) 0.1 % paste Place onto teeth 2 (two) times daily. 10 g 0     No current facility-administered medications for this visit.       Family History:   No family history on file.    Social History:   Social History     Socioeconomic History    Marital status: Single   Tobacco Use    Smoking status: Never    Smokeless tobacco: Never   Substance and Sexual Activity    Alcohol use: Yes     Comment: occ    Drug use: No    Sexual activity: Yes     Partners: Female       Review of Systems   Constitutional:  Negative for fever.   HENT:  Positive for mouth sores.     Cardiovascular:  Negative for orthopnea.       Objective:     Vitals:    10/11/23 1502   BP: 126/80   Pulse: (!) 53   Resp: 18   Temp: 97.6 °F (36.4 °C)        Physical Exam  HENT:      Mouth/Throat:           Assessment:       1. Oral aphthous ulcer    2. Anxiety        Plan:       Tomi was seen today for stress test results and mouth lesions.    Diagnoses and all orders for this visit:    Oral aphthous ulcer  -     triamcinolone acetonide 0.1% (KENALOG) 0.1 % paste; Place onto teeth 2 (two) times daily.    Anxiety  -     busPIRone (BUSPAR) 5 MG Tab; Take 1 tablet (5 mg total) by mouth 2 (two) times daily.         Follow up if symptoms worsen or fail to improve.

## 2023-10-13 ENCOUNTER — PATIENT MESSAGE (OUTPATIENT)
Dept: FAMILY MEDICINE | Facility: CLINIC | Age: 33
End: 2023-10-13

## 2023-10-16 ENCOUNTER — PATIENT MESSAGE (OUTPATIENT)
Dept: FAMILY MEDICINE | Facility: CLINIC | Age: 33
End: 2023-10-16

## 2023-10-16 NOTE — TELEPHONE ENCOUNTER
Patient message     I belch a lot        Tomi Montesinos Staff (supporting Jaguar Voss MD) 15 hours ago (6:16 PM)       The pain light when I eat but mostly when I drink        Tomi Montesinos Staff (supporting Jaguar Voss MD) 15 hours ago (6:12 PM)       Hey Dr Voss Im messaging you because I noticed a pain in the low center of my chest the pain come when I drink liquid like a sharp pain when it go down I never had this pain until I started taking the buspirone thats prolly not the cause but thats when the pain came everytime I drink any liquid

## 2023-12-04 PROBLEM — Z00.00 PREVENTATIVE HEALTH CARE: Status: RESOLVED | Noted: 2023-08-30 | Resolved: 2023-12-04

## 2024-01-30 ENCOUNTER — PATIENT MESSAGE (OUTPATIENT)
Dept: FAMILY MEDICINE | Facility: CLINIC | Age: 34
End: 2024-01-30
Payer: COMMERCIAL

## 2024-01-30 DIAGNOSIS — N52.9 ERECTILE DYSFUNCTION, UNSPECIFIED ERECTILE DYSFUNCTION TYPE: Primary | ICD-10-CM

## 2024-01-31 RX ORDER — TADALAFIL 5 MG/1
5 TABLET ORAL DAILY
Qty: 30 TABLET | Refills: 12 | Status: SHIPPED | OUTPATIENT
Start: 2024-01-31

## 2024-07-19 ENCOUNTER — PATIENT MESSAGE (OUTPATIENT)
Dept: FAMILY MEDICINE | Facility: CLINIC | Age: 34
End: 2024-07-19
Payer: COMMERCIAL

## 2024-07-26 ENCOUNTER — HOSPITAL ENCOUNTER (OUTPATIENT)
Dept: RADIOLOGY | Facility: HOSPITAL | Age: 34
Discharge: HOME | End: 2024-07-26
Attending: FAMILY MEDICINE
Payer: COMMERCIAL

## 2024-07-26 ENCOUNTER — OFFICE VISIT (OUTPATIENT)
Dept: FAMILY MEDICINE | Facility: CLINIC | Age: 34
End: 2024-07-26
Payer: COMMERCIAL

## 2024-07-26 VITALS
WEIGHT: 157.19 LBS | HEART RATE: 55 BPM | DIASTOLIC BLOOD PRESSURE: 81 MMHG | SYSTOLIC BLOOD PRESSURE: 127 MMHG | BODY MASS INDEX: 24.67 KG/M2 | HEIGHT: 67 IN | OXYGEN SATURATION: 97 %

## 2024-07-26 DIAGNOSIS — J20.8 ACUTE BRONCHITIS DUE TO COVID-19 VIRUS: ICD-10-CM

## 2024-07-26 DIAGNOSIS — U07.1 ACUTE BRONCHITIS DUE TO COVID-19 VIRUS: Primary | ICD-10-CM

## 2024-07-26 DIAGNOSIS — U07.1 ACUTE BRONCHITIS DUE TO COVID-19 VIRUS: ICD-10-CM

## 2024-07-26 DIAGNOSIS — J20.8 ACUTE BRONCHITIS DUE TO COVID-19 VIRUS: Primary | ICD-10-CM

## 2024-07-26 PROCEDURE — 71046 X-RAY EXAM CHEST 2 VIEWS: CPT | Mod: 26,,, | Performed by: RADIOLOGY

## 2024-07-26 PROCEDURE — 71046 X-RAY EXAM CHEST 2 VIEWS: CPT | Mod: TC

## 2024-07-26 PROCEDURE — 3074F SYST BP LT 130 MM HG: CPT | Mod: CPTII,S$GLB,, | Performed by: FAMILY MEDICINE

## 2024-07-26 PROCEDURE — 99213 OFFICE O/P EST LOW 20 MIN: CPT | Mod: S$GLB,,, | Performed by: FAMILY MEDICINE

## 2024-07-26 PROCEDURE — 1159F MED LIST DOCD IN RCRD: CPT | Mod: CPTII,S$GLB,, | Performed by: FAMILY MEDICINE

## 2024-07-26 PROCEDURE — 99999 PR PBB SHADOW E&M-EST. PATIENT-LVL III: CPT | Mod: PBBFAC,,, | Performed by: FAMILY MEDICINE

## 2024-07-26 PROCEDURE — 3008F BODY MASS INDEX DOCD: CPT | Mod: CPTII,S$GLB,, | Performed by: FAMILY MEDICINE

## 2024-07-26 PROCEDURE — 3079F DIAST BP 80-89 MM HG: CPT | Mod: CPTII,S$GLB,, | Performed by: FAMILY MEDICINE

## 2024-07-26 RX ORDER — AZITHROMYCIN 250 MG/1
250 TABLET, FILM COATED ORAL DAILY
Qty: 6 TABLET | Refills: 0 | Status: SHIPPED | OUTPATIENT
Start: 2024-07-26 | End: 2024-08-01

## 2024-07-26 RX ORDER — BENZONATATE 100 MG/1
100 CAPSULE ORAL 3 TIMES DAILY PRN
Qty: 30 CAPSULE | Refills: 1 | Status: SHIPPED | OUTPATIENT
Start: 2024-07-26 | End: 2024-08-15

## 2024-07-26 RX ORDER — PROMETHAZINE HYDROCHLORIDE AND DEXTROMETHORPHAN HYDROBROMIDE 6.25; 15 MG/5ML; MG/5ML
10 SYRUP ORAL NIGHTLY
Qty: 180 ML | Refills: 2 | Status: SHIPPED | OUTPATIENT
Start: 2024-07-26 | End: 2024-09-18

## 2024-07-26 NOTE — PROGRESS NOTES
Subjective:       Patient ID: Tomi Herrera is a 34 y.o. male.    Chief Complaint: Cough and Sore Throat      Is here as a follow-up on COVID he was diagnosed positive 01/07/2019 was doing okay until yesterday started to get more severe with chest pain and increasing cough.  Patient has been taking promethazine and vitamin regimen as recommended without improvement.        Cough  This is a recurrent problem. The current episode started in the past 7 days. The problem has been rapidly worsening. The problem occurs every few minutes. The cough is Productive of sputum. Associated symptoms include chest pain, ear congestion and a sore throat. The symptoms are aggravated by cold air. Risk factors for lung disease include occupational exposure. He has tried OTC cough suppressant, oral steroids and prescription cough suppressant for the symptoms. The treatment provided no relief. There is no history of asthma, bronchiectasis, bronchitis, COPD or emphysema.   Sore Throat   Associated symptoms include coughing.       Allergies and Medications:   Review of patient's allergies indicates:  No Known Allergies  Current Outpatient Medications   Medication Sig Dispense Refill    tadalafiL (CIALIS) 5 MG tablet Take 1 tablet (5 mg total) by mouth once daily. 30 tablet 12    aluminum & magnesium hydroxide-simethicone (MYLANTA MAXIMUM STRENGTH) 400-400-40 mg/5 mL suspension Take 15 mLs by mouth every 6 (six) hours as needed for Indigestion. (Patient not taking: Reported on 8/30/2023) 100 mL 0    azithromycin (Z-SILVANO) 250 MG tablet Take 1 tablet (250 mg total) by mouth once daily. po on day 1 then 1 tab po on days 2-5 for 6 doses 6 tablet 0    benzonatate (TESSALON) 100 MG capsule Take 1 capsule (100 mg total) by mouth 3 (three) times daily as needed for Cough. 30 capsule 1    busPIRone (BUSPAR) 5 MG Tab Take 1 tablet (5 mg total) by mouth 2 (two) times daily. (Patient not taking: Reported on 7/26/2024) 60 tablet 11    famotidine (PEPCID)  20 MG tablet Take 1 tablet (20 mg total) by mouth 2 (two) times daily. for 14 days (Patient not taking: Reported on 8/30/2023) 28 tablet 0    mupirocin (BACTROBAN) 2 % ointment Apply topically 3 (three) times daily. (Patient not taking: Reported on 10/11/2023) 30 g 2    ondansetron (ZOFRAN-ODT) 4 MG TbDL Take 1-2 tablets (4-8 mg total) by mouth every 8 (eight) hours as needed (Nausea/Vomiting). (Patient not taking: Reported on 8/30/2023) 20 tablet 0    pantoprazole (PROTONIX) 20 MG tablet Take 1 tablet (20 mg total) by mouth 2 (two) times daily before meals. for 14 days (Patient not taking: Reported on 8/30/2023) 28 tablet 0    promethazine-dextromethorphan (PROMETHAZINE-DM) 6.25-15 mg/5 mL Syrp Take 10 mLs by mouth every evening. 180 mL 2     No current facility-administered medications for this visit.       Family History:   No family history on file.    Social History:   Social History     Socioeconomic History    Marital status: Single   Tobacco Use    Smoking status: Never    Smokeless tobacco: Never   Substance and Sexual Activity    Alcohol use: Yes     Comment: occ    Drug use: No    Sexual activity: Yes     Partners: Female     Social Determinants of Health     Financial Resource Strain: Medium Risk (7/26/2024)    Overall Financial Resource Strain (CARDIA)     Difficulty of Paying Living Expenses: Somewhat hard   Food Insecurity: No Food Insecurity (7/26/2024)    Hunger Vital Sign     Worried About Running Out of Food in the Last Year: Never true     Ran Out of Food in the Last Year: Never true   Physical Activity: Unknown (7/26/2024)    Exercise Vital Sign     Days of Exercise per Week: 5 days   Stress: No Stress Concern Present (7/26/2024)    [employer] - Occupational Stress Questionnaire     Feeling of Stress : Not at all   Housing Stability: High Risk (7/26/2024)    Housing Stability Vital Sign     Unable to Pay for Housing in the Last Year: Yes       Review of Systems    HENT:  Positive for sore throat.    Respiratory:  Positive for cough.    Cardiovascular:  Positive for chest pain.       Objective:     Vitals:    07/26/24 1102   BP: 127/81   Pulse: (!) 55        Physical Exam  Vitals and nursing note reviewed.   Constitutional:       General: He is not in acute distress.     Appearance: He is well-developed. He is not diaphoretic.   HENT:      Head: Normocephalic and atraumatic.      Right Ear: Hearing, tympanic membrane, ear canal and external ear normal. No decreased hearing noted. No drainage, swelling or tenderness. No middle ear effusion. No foreign body. No hemotympanum. Tympanic membrane is not injected, scarred, perforated, erythematous, retracted or bulging. Tympanic membrane has normal mobility.      Left Ear: Hearing, tympanic membrane, ear canal and external ear normal. No decreased hearing noted. No drainage, swelling or tenderness.  No middle ear effusion. No foreign body. No hemotympanum. Tympanic membrane is not injected, scarred, perforated, erythematous, retracted or bulging. Tympanic membrane has normal mobility.      Nose: Nose normal. No nasal deformity, septal deviation, laceration, mucosal edema or rhinorrhea.      Right Sinus: No maxillary sinus tenderness or frontal sinus tenderness.      Left Sinus: No maxillary sinus tenderness or frontal sinus tenderness.      Mouth/Throat:      Dentition: Normal dentition.      Pharynx: Uvula midline. No oropharyngeal exudate or posterior oropharyngeal erythema.      Tonsils: No tonsillar abscesses.   Eyes:      General: No scleral icterus.        Right eye: No discharge.         Left eye: No discharge.      Conjunctiva/sclera: Conjunctivae normal.      Pupils: Pupils are equal, round, and reactive to light.   Neck:      Thyroid: No thyromegaly.   Cardiovascular:      Rate and Rhythm: Normal rate and regular rhythm.      Heart sounds: Normal heart sounds. No murmur heard.     No friction rub. No gallop.   Pulmonary:       Effort: Pulmonary effort is normal. No respiratory distress.      Breath sounds: Normal breath sounds. No stridor. No wheezing, rhonchi or rales.   Chest:      Chest wall: No tenderness.   Musculoskeletal:      Cervical back: Normal range of motion and neck supple.   Lymphadenopathy:      Cervical: No cervical adenopathy.         Assessment:       1. Acute bronchitis due to COVID-19 virus        Plan:       Tomi was seen today for cough and sore throat.    Diagnoses and all orders for this visit:    Acute bronchitis due to COVID-19 virus  -     X-Ray Chest PA And Lateral; Future  -     benzonatate (TESSALON) 100 MG capsule; Take 1 capsule (100 mg total) by mouth 3 (three) times daily as needed for Cough.  -     promethazine-dextromethorphan (PROMETHAZINE-DM) 6.25-15 mg/5 mL Syrp; Take 10 mLs by mouth every evening.  -     azithromycin (Z-SILVANO) 250 MG tablet; Take 1 tablet (250 mg total) by mouth once daily. po on day 1 then 1 tab po on days 2-5 for 6 doses         No follow-ups on file.

## 2024-07-29 ENCOUNTER — PATIENT MESSAGE (OUTPATIENT)
Dept: FAMILY MEDICINE | Facility: CLINIC | Age: 34
End: 2024-07-29
Payer: COMMERCIAL

## 2024-08-07 ENCOUNTER — TELEPHONE (OUTPATIENT)
Dept: FAMILY MEDICINE | Facility: CLINIC | Age: 34
End: 2024-08-07
Payer: COMMERCIAL

## 2024-08-08 ENCOUNTER — OFFICE VISIT (OUTPATIENT)
Dept: FAMILY MEDICINE | Facility: CLINIC | Age: 34
End: 2024-08-08
Payer: COMMERCIAL

## 2024-08-08 VITALS
HEART RATE: 61 BPM | SYSTOLIC BLOOD PRESSURE: 122 MMHG | OXYGEN SATURATION: 99 % | WEIGHT: 155 LBS | DIASTOLIC BLOOD PRESSURE: 80 MMHG | HEIGHT: 67 IN | BODY MASS INDEX: 24.33 KG/M2

## 2024-08-08 DIAGNOSIS — T80.89XD: Primary | ICD-10-CM

## 2024-08-08 DIAGNOSIS — F41.9: Primary | ICD-10-CM

## 2024-08-08 PROBLEM — T80.89XA: Status: ACTIVE | Noted: 2024-08-08

## 2024-08-08 PROCEDURE — 99999 PR PBB SHADOW E&M-EST. PATIENT-LVL III: CPT | Mod: PBBFAC,,, | Performed by: FAMILY MEDICINE

## 2024-08-12 ENCOUNTER — PATIENT MESSAGE (OUTPATIENT)
Dept: FAMILY MEDICINE | Facility: CLINIC | Age: 34
End: 2024-08-12
Payer: COMMERCIAL

## 2024-08-20 ENCOUNTER — PATIENT MESSAGE (OUTPATIENT)
Dept: FAMILY MEDICINE | Facility: CLINIC | Age: 34
End: 2024-08-20
Payer: COMMERCIAL

## 2024-08-21 ENCOUNTER — TELEPHONE (OUTPATIENT)
Dept: FAMILY MEDICINE | Facility: CLINIC | Age: 34
End: 2024-08-21
Payer: COMMERCIAL

## 2024-08-23 ENCOUNTER — PATIENT MESSAGE (OUTPATIENT)
Dept: FAMILY MEDICINE | Facility: CLINIC | Age: 34
End: 2024-08-23
Payer: COMMERCIAL

## 2024-09-08 ENCOUNTER — PATIENT MESSAGE (OUTPATIENT)
Dept: FAMILY MEDICINE | Facility: CLINIC | Age: 34
End: 2024-09-08
Payer: COMMERCIAL

## 2024-09-09 NOTE — TELEPHONE ENCOUNTER
Spoke to pt and attempted to make an appt in office to see Dr. Voss. Pt states that he is currently walking into urgent care regarding this issue. Advised pt to f/u with Dr. Voss after urgent care visit.

## 2024-09-10 ENCOUNTER — TELEPHONE (OUTPATIENT)
Dept: FAMILY MEDICINE | Facility: CLINIC | Age: 34
End: 2024-09-10
Payer: COMMERCIAL

## 2024-09-10 NOTE — TELEPHONE ENCOUNTER
----- Message from Lisa Israel sent at 9/10/2024  9:09 AM CDT -----  Pt is returning the office call. Pt #[phone]

## 2024-10-01 ENCOUNTER — OFFICE VISIT (OUTPATIENT)
Dept: FAMILY MEDICINE | Facility: CLINIC | Age: 34
End: 2024-10-01
Payer: COMMERCIAL

## 2024-10-01 VITALS
BODY MASS INDEX: 24.8 KG/M2 | DIASTOLIC BLOOD PRESSURE: 87 MMHG | HEIGHT: 67 IN | HEART RATE: 76 BPM | WEIGHT: 158 LBS | SYSTOLIC BLOOD PRESSURE: 138 MMHG

## 2024-10-01 DIAGNOSIS — R68.82 LOW LIBIDO: ICD-10-CM

## 2024-10-01 DIAGNOSIS — R30.0 DYSURIA: Primary | ICD-10-CM

## 2024-10-01 LAB
BILIRUB UR QL STRIP: NEGATIVE
CLARITY UR: CLEAR
COLOR UR: NORMAL
GLUCOSE UR QL STRIP: NEGATIVE
KETONES UR QL STRIP: NEGATIVE
LEUKOCYTE ESTERASE UR QL STRIP: NEGATIVE
PH, POC UA: 5 (ref 5–8)
POC BLOOD, URINE: NEGATIVE
POC NITRATES, URINE: NEGATIVE
PROT UR QL STRIP: NEGATIVE
SP GR UR STRIP: 1.01 (ref 1–1.03)
UROBILINOGEN UR STRIP-ACNC: NORMAL (ref 0.3–2.2)

## 2024-10-01 PROCEDURE — 1159F MED LIST DOCD IN RCRD: CPT | Mod: CPTII,S$GLB,, | Performed by: INTERNAL MEDICINE

## 2024-10-01 PROCEDURE — 99214 OFFICE O/P EST MOD 30 MIN: CPT | Mod: S$GLB,,, | Performed by: INTERNAL MEDICINE

## 2024-10-01 PROCEDURE — 3079F DIAST BP 80-89 MM HG: CPT | Mod: CPTII,S$GLB,, | Performed by: INTERNAL MEDICINE

## 2024-10-01 PROCEDURE — 1160F RVW MEDS BY RX/DR IN RCRD: CPT | Mod: CPTII,S$GLB,, | Performed by: INTERNAL MEDICINE

## 2024-10-01 PROCEDURE — 3075F SYST BP GE 130 - 139MM HG: CPT | Mod: CPTII,S$GLB,, | Performed by: INTERNAL MEDICINE

## 2024-10-01 PROCEDURE — 99999 PR PBB SHADOW E&M-EST. PATIENT-LVL III: CPT | Mod: PBBFAC,,, | Performed by: INTERNAL MEDICINE

## 2024-10-01 PROCEDURE — 81003 URINALYSIS AUTO W/O SCOPE: CPT | Mod: QW,S$GLB,, | Performed by: INTERNAL MEDICINE

## 2024-10-01 PROCEDURE — 3008F BODY MASS INDEX DOCD: CPT | Mod: CPTII,S$GLB,, | Performed by: INTERNAL MEDICINE

## 2024-10-01 NOTE — PROGRESS NOTES
Subjective:       Patient ID: Tomi Herrera is a 34 y.o. male.    Chief Complaint: Urinary Frequency, Dysuria, Back Pain, and Erectile Dysfunction     Mr. Tomi Herrera is a  34-year-old male-patient of Dr. Jaguar Voss who comes for urgent same-day visit.   He had been to  a local (non [facility])urgent care center for burning sensation in the urine.  He had a STD panel which he informs me was negative.  Not sure if he was found to have any non STD infection on a routine basis.      Today he states that he has come to check out for any infection.  (Not sure why if he was checked -2 days back, he needs to be checked again. )      Patient is not a very astute historian and seems to be somewhat cloaked  and veiled with description of his Problems.    His explanation of his problems fluctuate and description includes from feeling fatigued, having no energy to having low desire, having low intimacy performance, having a burning sensation while passing urine, having low self-esteem and confidence in himself.  He has been prescribed Cialis at 5 mg which he takes occasionally will either no relief or some relief.  Sometimes he states that he performs very fine. On some occasions he states that he wants to be very confident and excellent in his intimacy performance.  He does admit that his wife has not expressed any frustration with him.      On trying to understand as to the reason of his lack of confidence, he did admit that he was going through lot of stress in life.   The cause of stressors was difficult to elucidate.    He does remain constipated also.    He also complains of some  rectal discharge. He was told by the urgent care that might be some infection which he may need to be checked out.      While he allowed genital examination today with his permission, he was somewhat queasy and unsure of rectal examination which was then deferred given his discomfort     He has been in a monogamous relationship with a current  partner and has a 7-year-old child also      He does not recall his partner having any medical problems or genitourinary problems.    I did check the epic records and there was no urgent care information for his visit concerning this issue in the epic system and hence I believe he had gone to non [facility] non epic urgent  care center and I do not have records at this point.    Urinary Frequency   This is a new problem. The current episode started in the past 7 days. The problem occurs every urination. The problem has been waxing and waning. The quality of the pain is described as burning. The pain is at a severity of 1/10. There has been no fever. Associated symptoms include constipation. Pertinent negatives include no nausea.   Dysuria   This is a new problem. The current episode started in the past 7 days. The problem occurs every urination. The problem has been waxing and waning. The quality of the pain is described as burning. The pain is mild. There has been no fever. He is Sexually active. There is No history of pyelonephritis. Associated symptoms include constipation. Pertinent negatives include no nausea. The treatment provided no relief.   Erectile Dysfunction    Constipation  This is a chronic problem. The current episode started more than 1 year ago. The problem has been waxing and waning since onset. His stool frequency is 2 to 3 times per week. The stool is described as pellet like. The patient is not on a high fiber diet. He Does not exercise regularly. There has Been adequate water intake (drinks lot of water but could not given explanation why). Pertinent negatives include no bloating, fecal incontinence, fever, flatus, melena or nausea. Risk factors: probably anxious and nervous. The treatment provided no relief. There is no history of abdominal surgery or endocrine disease.       Past Medical History:   Diagnosis Date    GERD (gastroesophageal reflux disease)      Social History     Socioeconomic  "History    Marital status: Single   Tobacco Use    Smoking status: Never    Smokeless tobacco: Never   Substance and Sexual Activity    Alcohol use: Yes     Comment: occ    Drug use: No    Sexual activity: Yes     Partners: Female     Social Drivers of Health     Financial Resource Strain: Medium Risk (7/26/2024)    Overall Financial Resource Strain (CARDIA)     Difficulty of Paying Living Expenses: Somewhat hard   Food Insecurity: No Food Insecurity (7/26/2024)    Hunger Vital Sign     Worried About Running Out of Food in the Last Year: Never true     Ran Out of Food in the Last Year: Never true   Physical Activity: Unknown (7/26/2024)    Exercise Vital Sign     Days of Exercise per Week: 5 days   Stress: No Stress Concern Present (7/26/2024)    [facility] - Occupational Stress Questionnaire     Feeling of Stress : Not at all   Housing Stability: High Risk (7/26/2024)    Housing Stability Vital Sign     Unable to Pay for Housing in the Last Year: Yes     History reviewed. No pertinent surgical history.  No family history on file.    Review of Systems   Constitutional:  Negative for fever.   HENT:  Negative for hearing loss, rhinorrhea and trouble swallowing.    Eyes:  Negative for discharge and visual disturbance.   Respiratory:  Negative for chest tightness, shortness of breath and wheezing.    Gastrointestinal:  Positive for constipation. Negative for bloating, flatus, melena and nausea.   Psychiatric/Behavioral:  Negative for dysphoric mood.          Objective:      Blood pressure 138/87, pulse 76, height 5' 7" (1.702 m), weight 71.7 kg (158 lb). Body mass index is 24.75 kg/m².  Physical Exam  Vitals and nursing note reviewed.   Constitutional:       Appearance: He is well-developed. He is not diaphoretic.   HENT:      Head: Normocephalic.   Eyes:      Conjunctiva/sclera: Conjunctivae normal.   Cardiovascular:      Rate and Rhythm: Normal rate and regular rhythm.      Heart sounds: " Normal heart sounds. No murmur heard.     No friction rub. No gallop.   Pulmonary:      Effort: Pulmonary effort is normal. No respiratory distress.      Breath sounds: Normal breath sounds. No stridor. No wheezing, rhonchi or rales.   Chest:      Chest wall: No tenderness.   Skin:     General: Skin is warm and dry.           Assessment:           1. Dysuria  -     POCT Urinalysis, Dipstick, Automated, W/O Scope  -     CULTURE, URINE; Future; Expected date: 10/02/2024    2. Low libido  -     TESTOSTERONE; Future; Expected date: 10/01/2024      Patient   Was gently explained about the nature of his problems and issues and to be more forthright and clear as to his expectations and concerns.     I Feel that his most important concern is that why at age of 34 he can not perform  intimacy acts like a 20-year-old person. He feels something is gravely wrong with him.           Urine dipstick in the office is negative  but will give the benefit of doubt that he might have a prostate infection will send it for culture.    Not sure if he has a overactive bladder which might be aggravated by constipation..    Potential  causes of anal discharge (he declined examination) could be several fold and needs some trust in discussing this in future with the primary care physician    Plan:   Dysuria  -     POCT Urinalysis, Dipstick, Automated, W/O Scope  -     CULTURE, URINE; Future; Expected date: 10/02/2024    Low libido  -     TESTOSTERONE; Future; Expected date: 10/01/2024      The urine dipstick test in the office is negative.    Will check a urine culture.    He has somewhat low libido and low performance.  Probably mostly secondary to anxiety.  His testosterone levels 2 years back were normal.  Will check another testosterone in the morning.  Will notify him accordingly.    As far as ED symptoms are concerned, I do not have a good answer for that and may need to see Urology.  Probably his expectations are too much compounded  with the anxiety and other issues which are beyond the scope of this visit. ( Social Media )     He does get constipated which can affect his overall issues.  Probably IBS C type of personality.    I have advised him to take some Metamucil or Citrucel and add some more fiber to the diet.  He does have few boxes of Metamucil interestingly at home.  I have advised him to add fiber and remain hydrated also.  Do regular exercise also.  His stress and anxiety may need to be addressed.     Follow up with Dr. Voss  Follow up in about 5 weeks (around 11/8/2024), or if symptoms worsen or fail to improve, for Keep regular appointment with Dr. Kevin davila.     Addendum on 10/03 2024 with the results testosterone level is even better than last time.  I am hoping he is not taking any exogenous testosterone.  Component Ref Range & Units 09:09 6 yr ago   Testosterone, Total 304 - 1227 ng/dL 634 258 R   Resulting Agency  [facility]                 Current Outpatient Medications:     tadalafiL (CIALIS) 5 MG tablet, Take 1 tablet (5 mg total) by mouth once daily., Disp: 30 tablet, Rfl: 12    [facility]    The presence of a pus-like discharge from the anus and rectum in a 34-year-old male, coupled with constipation, can be indicative of several possible conditions. Given that an STD workup is negative, we can consider the following differential diagnoses:  Anorectal Abscess: This is a collection of pus in the tissue around the anus and rectum, often resulting from an infection of an anal gland. Symptoms include pain, swelling, and discharge of pus.  Fistula-in-Ano: This condition often follows an abscess. It involves an abnormal connection between the rectum or anal canal and the skin near the anus. Pus discharge and pain are common symptoms.  Inflammatory Bowel Disease (IBD): Conditions like Crohn's disease can cause abscesses and fistulas, leading to pus discharge. Other symptoms might include abdominal pain, diarrhea, and weight  loss.  Diverticulitis: Inflammation or infection of diverticula (small pouches) in the colon can lead to abscess formation and pus discharge. This is more common in older adults but can occur in younger individuals.  Infectious Colitis: Bacterial infections (e.g., Salmonella, Shigella, E. coli) can cause colitis, leading to symptoms like pus discharge, diarrhea, fever, and abdominal pain.  Rectal Prolapse with Secondary Infection: In some cases, a rectal prolapse can become infected, leading to pus discharge. Constipation can be a contributing factor to prolapse.  Anal Fissures with Secondary Infection: Chronic constipation can lead to anal fissures, which can become infected and produce pus.  Given these possibilities, further diagnostic workup is warranted. This may include:  Physical Examination: Digital rectal exam to check for abscesses, fistulas, or other abnormalities.  Imaging Studies: MRI or CT scan to identify abscesses or fistulas.  Colonoscopy: To check for inflammatory bowel disease, diverticulitis, or other colonic pathologies.  Stool Culture: To rule out infectious colitis.  Blood Tests: Including complete blood count (CBC) to check for signs of infection or inflammation, and inflammatory markers like ESR and CRP.  A multidisciplinary approach involving a gastroenterologist, infectious disease specialist, and possibly a colorectal surgeon may be necessary for comprehensive management and treatment planning.  Recommendations:  Prompt Evaluation: Immediate consultation with a gastroenterologist or colorectal surgeon.  Symptomatic Relief: Management of constipation with dietary modifications, fiber supplements, and possibly laxatives.  Antibiotics: If an abscess or bacterial infection is confirmed.  Surgical Intervention: If an abscess or fistula is present, surgical drainage or repair may be required.  It is important to address this condition promptly to prevent complications and ensure appropriate  treatment.

## 2024-10-02 ENCOUNTER — TELEPHONE (OUTPATIENT)
Dept: FAMILY MEDICINE | Facility: CLINIC | Age: 34
End: 2024-10-02
Payer: COMMERCIAL

## 2024-10-02 NOTE — TELEPHONE ENCOUNTER
----- Message from Med Assistant Kern sent at 10/2/2024  2:18 PM CDT -----  Patient is calling stating he will not be able to make it to [place] to complete his labs.  Would like to know if orders can be sent to [facility] in [place] for him to have his lab drawn.    Pt: [phone]

## 2024-10-02 NOTE — TELEPHONE ENCOUNTER
Returned patients call to advise the lab should be visible in the computer to [facility] in [place]. Verbal understanding was expressed.

## 2024-12-18 ENCOUNTER — PATIENT MESSAGE (OUTPATIENT)
Dept: FAMILY MEDICINE | Facility: CLINIC | Age: 34
End: 2024-12-18
Payer: COMMERCIAL

## 2025-02-14 DIAGNOSIS — N52.9 ERECTILE DYSFUNCTION, UNSPECIFIED ERECTILE DYSFUNCTION TYPE: ICD-10-CM

## 2025-02-14 RX ORDER — TADALAFIL 5 MG/1
5 TABLET ORAL
Qty: 90 TABLET | Refills: 1 | Status: SHIPPED | OUTPATIENT
Start: 2025-02-14

## 2025-02-14 NOTE — TELEPHONE ENCOUNTER
Refill Decision Note   Tomi Javier  is requesting a refill authorization.  Brief Assessment and Rationale for Refill:  Approve     Medication Therapy Plan:        Comments:     Note composed:3:20 PM 02/14/2025

## 2025-02-14 NOTE — TELEPHONE ENCOUNTER
Exercise for a Healthier Heart     Exercise with a friend. When activity is fun, you're more likely to stick with it.   You may wonder how you can improve the health of your heart. If you’re thinking about exercise, you’re on the right track. You don’t need to become an athlete. But you do need a certain amount of brisk exercise to help strengthen your heart. If you have been diagnosed with a heart condition, your healthcare provider may advise exercise to help stabilize your condition. To help make exercise a habit, choose safe, fun activities.   Before you start  Check with your healthcare provider before starting an exercise program. This is especially important if you have not been active for a while. It's also important if you have a long-term (chronic) health problem such as heart disease, diabetes, or obesity. Or if you are at high risk for having these problems.   Why exercise?  Exercising regularly offers many healthy rewards. It can help you do all of the following:  · Improve your blood cholesterol level to help prevent further heart trouble  · Lower your blood pressure to help prevent a stroke or heart attack  · Control diabetes, or reduce your risk of getting this disease  · Improve your heart and lung function  · Reach and stay at a healthy weight  · Make your muscles stronger so you can stay active  · Prevent falls and fractures by slowing the loss of bone mass (osteoporosis)  · Manage stress better  · Reduce your blood pressure  · Improve your sense of self and your body image  Exercise tips    · Ease into your routine. Set small goals. Then build on them. If you are not sure what your activity level should be, talk with your healthcare provider first before starting an exercise routine.  · Exercise on most days. Aim for a total of 150 minutes (2 hours and 30 minutes) or more of moderate-intensity aerobic activity each week. Or 75 minutes (1 hour and 15 minutes) or more of vigorous-intensity  No care due was identified.  [facility] Embedded Care Due Messages. Reference number: [id number].   2/14/2025 12:26:32 PM CST   aerobic activity each week. Or try for a combination of both. Moderate activity means that you breathe heavier and your heart rate increases but you can still talk. Think about doing 40 minutes of moderate exercise, 3 to 4 times a week. For best results, activity should last for about 40 minutes to lower blood pressure and cholesterol. It's OK to work up to the 40-minute period over time. Examples of moderate-intensity activity are walking 1 mile in 15 minutes. Or doing 30 to 45 minutes of yard work.  · Step up your daily activity level.  Along with your exercise program, try being more active the whole day. Walk instead of drive. Or park further away so that you take more steps each day. Do more household tasks or yard work. You may not be able to meet the advised mount of physical activity. But doing some moderate- or vigorous-intensity aerobic activity can help reduce your risk for heart disease. Your healthcare provider can help you figure out what is best for you.  · Choose 1 or more activities you enjoy.  Walking is one of the easiest things you can do. You can also try swimming, riding a bike, dancing, or taking an exercise class.    When to call your healthcare provider  Call your healthcare provider if you have any of these:   · Chest pain or feel dizzy or lightheaded  · Burning, tightness, pressure, or heaviness in your chest, neck, shoulders, back, or arms  · Abnormal shortness of breath  · More joint or muscle pain  · A very fast or irregular heartbeat (palpitations)  [employer] last reviewed this educational content on 7/1/2019  © 4407-1492 [employer]. [address] [place] [address], [place] [address]. All rights reserved. This information is not intended as a substitute for professional medical care. Always follow your healthcare professional's instructions.

## 2025-02-18 ENCOUNTER — TELEPHONE (OUTPATIENT)
Dept: FAMILY MEDICINE | Facility: CLINIC | Age: 35
End: 2025-02-18
Payer: COMMERCIAL

## 2025-05-15 ENCOUNTER — OFFICE VISIT (OUTPATIENT)
Dept: FAMILY MEDICINE | Facility: CLINIC | Age: 35
End: 2025-05-15
Payer: COMMERCIAL

## 2025-05-15 ENCOUNTER — PATIENT MESSAGE (OUTPATIENT)
Dept: FAMILY MEDICINE | Facility: CLINIC | Age: 35
End: 2025-05-15

## 2025-05-15 ENCOUNTER — LAB VISIT (OUTPATIENT)
Dept: LAB | Facility: HOSPITAL | Age: 35
End: 2025-05-15
Attending: FAMILY MEDICINE
Payer: COMMERCIAL

## 2025-05-15 VITALS
OXYGEN SATURATION: 100 % | RESPIRATION RATE: 17 BRPM | TEMPERATURE: 99 F | HEIGHT: 67 IN | BODY MASS INDEX: 24.49 KG/M2 | HEART RATE: 63 BPM | DIASTOLIC BLOOD PRESSURE: 84 MMHG | SYSTOLIC BLOOD PRESSURE: 108 MMHG | WEIGHT: 156.06 LBS

## 2025-05-15 DIAGNOSIS — N52.1 ERECTILE DYSFUNCTION DUE TO DISEASES CLASSIFIED ELSEWHERE: ICD-10-CM

## 2025-05-15 DIAGNOSIS — R45.89 ANXIETY ABOUT TREATMENT: ICD-10-CM

## 2025-05-15 DIAGNOSIS — K64.8 HEMORRHOID PROLAPSE: ICD-10-CM

## 2025-05-15 DIAGNOSIS — Z00.00 PREVENTATIVE HEALTH CARE: ICD-10-CM

## 2025-05-15 DIAGNOSIS — F41.9 ANXIETY: Primary | ICD-10-CM

## 2025-05-15 DIAGNOSIS — F41.9 ANXIETY: ICD-10-CM

## 2025-05-15 DIAGNOSIS — F32.1 MAJOR DEPRESSIVE DISORDER, SINGLE EPISODE, MODERATE: ICD-10-CM

## 2025-05-15 DIAGNOSIS — N52.9 ERECTILE DYSFUNCTION, UNSPECIFIED ERECTILE DYSFUNCTION TYPE: ICD-10-CM

## 2025-05-15 PROBLEM — T80.89XA: Status: RESOLVED | Noted: 2024-08-08 | Resolved: 2025-05-15

## 2025-05-15 LAB
25(OH)D3+25(OH)D2 SERPL-MCNC: 28 NG/ML (ref 30–96)
ABSOLUTE EOSINOPHIL (OHS): 0.12 K/UL
ABSOLUTE MONOCYTE (OHS): 0.35 K/UL (ref 0.3–1)
ABSOLUTE NEUTROPHIL COUNT (OHS): 1.7 K/UL (ref 1.8–7.7)
ALBUMIN SERPL BCP-MCNC: 4.1 G/DL (ref 3.5–5.2)
ALP SERPL-CCNC: 54 UNIT/L (ref 40–150)
ALT SERPL W/O P-5'-P-CCNC: 34 UNIT/L (ref 10–44)
ANION GAP (OHS): 9 MMOL/L (ref 8–16)
AST SERPL-CCNC: 24 UNIT/L (ref 11–45)
BASOPHILS # BLD AUTO: 0.04 K/UL
BASOPHILS NFR BLD AUTO: 1 %
BILIRUB SERPL-MCNC: 0.8 MG/DL (ref 0.1–1)
BUN SERPL-MCNC: 11 MG/DL (ref 6–20)
CALCIUM SERPL-MCNC: 9.1 MG/DL (ref 8.7–10.5)
CHLORIDE SERPL-SCNC: 105 MMOL/L (ref 95–110)
CHOLEST SERPL-MCNC: 201 MG/DL (ref 120–199)
CHOLEST/HDLC SERPL: 3.6 {RATIO} (ref 2–5)
CO2 SERPL-SCNC: 27 MMOL/L (ref 23–29)
CREAT SERPL-MCNC: 0.9 MG/DL (ref 0.5–1.4)
EAG (OHS): 108 MG/DL (ref 68–131)
ERYTHROCYTE [DISTWIDTH] IN BLOOD BY AUTOMATED COUNT: 12.1 % (ref 11.5–14.5)
GFR SERPLBLD CREATININE-BSD FMLA CKD-EPI: >60 ML/MIN/1.73/M2
GLUCOSE SERPL-MCNC: 87 MG/DL (ref 70–110)
HBA1C MFR BLD: 5.4 % (ref 4–5.6)
HCT VFR BLD AUTO: 48.9 % (ref 40–54)
HDLC SERPL-MCNC: 56 MG/DL (ref 40–75)
HDLC SERPL: 27.9 % (ref 20–50)
HGB BLD-MCNC: 15.3 GM/DL (ref 14–18)
IMM GRANULOCYTES # BLD AUTO: 0.01 K/UL (ref 0–0.04)
IMM GRANULOCYTES NFR BLD AUTO: 0.2 % (ref 0–0.5)
LDLC SERPL CALC-MCNC: 131.8 MG/DL (ref 63–159)
LYMPHOCYTES # BLD AUTO: 1.9 K/UL (ref 1–4.8)
MCH RBC QN AUTO: 27.8 PG (ref 27–31)
MCHC RBC AUTO-ENTMCNC: 31.3 G/DL (ref 32–36)
MCV RBC AUTO: 89 FL (ref 82–98)
NONHDLC SERPL-MCNC: 145 MG/DL
NUCLEATED RBC (/100WBC) (OHS): 0 /100 WBC
PLATELET # BLD AUTO: 283 K/UL (ref 150–450)
PMV BLD AUTO: 10.5 FL (ref 9.2–12.9)
POTASSIUM SERPL-SCNC: 4.6 MMOL/L (ref 3.5–5.1)
PROT SERPL-MCNC: 7.5 GM/DL (ref 6–8.4)
RBC # BLD AUTO: 5.51 M/UL (ref 4.6–6.2)
RELATIVE EOSINOPHIL (OHS): 2.9 %
RELATIVE LYMPHOCYTE (OHS): 46.1 % (ref 18–48)
RELATIVE MONOCYTE (OHS): 8.5 % (ref 4–15)
RELATIVE NEUTROPHIL (OHS): 41.3 % (ref 38–73)
SODIUM SERPL-SCNC: 141 MMOL/L (ref 136–145)
T4 SERPL-MCNC: 6.3 UG/DL (ref 4.5–11.5)
TRIGL SERPL-MCNC: 66 MG/DL (ref 30–150)
TSH SERPL-ACNC: 0.86 UIU/ML (ref 0.4–4)
WBC # BLD AUTO: 4.12 K/UL (ref 3.9–12.7)

## 2025-05-15 PROCEDURE — 80061 LIPID PANEL: CPT

## 2025-05-15 PROCEDURE — 99999 PR PBB SHADOW E&M-EST. PATIENT-LVL IV: CPT | Mod: PBBFAC,,, | Performed by: FAMILY MEDICINE

## 2025-05-15 PROCEDURE — 80053 COMPREHEN METABOLIC PANEL: CPT

## 2025-05-15 PROCEDURE — 85025 COMPLETE CBC W/AUTO DIFF WBC: CPT

## 2025-05-15 PROCEDURE — 84436 ASSAY OF TOTAL THYROXINE: CPT

## 2025-05-15 PROCEDURE — 36415 COLL VENOUS BLD VENIPUNCTURE: CPT | Mod: PN

## 2025-05-15 PROCEDURE — 84443 ASSAY THYROID STIM HORMONE: CPT

## 2025-05-15 PROCEDURE — 82306 VITAMIN D 25 HYDROXY: CPT

## 2025-05-15 PROCEDURE — 83036 HEMOGLOBIN GLYCOSYLATED A1C: CPT

## 2025-05-15 RX ORDER — ERGOCALCIFEROL 1.25 MG/1
50000 CAPSULE ORAL
Qty: 4 CAPSULE | Refills: 11 | Status: SHIPPED | OUTPATIENT
Start: 2025-05-15 | End: 2026-05-15

## 2025-05-15 RX ORDER — CALCIUM POLYCARBOPHIL 625 MG
625 TABLET ORAL DAILY
Qty: 90 TABLET | Refills: 3 | Status: SHIPPED | OUTPATIENT
Start: 2025-05-15 | End: 2026-05-15

## 2025-05-15 RX ORDER — TADALAFIL 20 MG/1
20 TABLET ORAL DAILY
Qty: 18 TABLET | Refills: 11 | Status: SHIPPED | OUTPATIENT
Start: 2025-05-15 | End: 2026-05-15

## 2025-05-15 NOTE — TELEPHONE ENCOUNTER
Patient is given information about the [facility] in his lab test. It is 31.9 which is considered normal. He voiced understanding.

## 2025-05-15 NOTE — PROGRESS NOTES
Subjective:       Patient ID: Tomi Herrera is a 35 y.o. male.    Chief Complaint: Follow-up      History of Present Illness               Allergies and Medications:   Review of patient's allergies indicates:  No Known Allergies  Current Medications[1]    Family History:   No family history on file.    Social History:   Social History[2]  Lab Results   Component Value Date    WBC 4.67 08/30/2023    HGB 14.8 08/30/2023    HCT 46.4 08/30/2023     08/30/2023    CHOL 179 08/30/2023    TRIG 31 08/30/2023    HDL 52 08/30/2023    ALT 24 08/30/2023    AST 26 08/30/2023     08/30/2023    K 3.9 08/30/2023     08/30/2023    CREATININE 1.0 08/30/2023    BUN 14 08/30/2023    CO2 26 08/30/2023    TSH 2.37 12/12/2018    PSA 0.35 11/16/2019           Objective:     Vitals:    05/15/25 0956   BP: 108/84   Pulse: 63   Resp: 17   Temp: 98.7 °F (37.1 °C)        Physical Exam                 Assessment:       No diagnosis found.    Plan:       Assessment & Plan             There are no diagnoses linked to this encounter.     No follow-ups on file.  This note was generated with the assistance of ambient listening technology. Verbal consent was obtained by the patient and accompanying visitor(s) for the recording of patient appointment to facilitate this note. I attest to having reviewed and edited the generated note for accuracy, though some syntax or spelling errors may persist. Please contact the author of this note for any clarification.            [1]   Current Outpatient Medications   Medication Sig Dispense Refill    tadalafiL (CIALIS) 5 MG tablet TAKE 1 TABLET BY MOUTH EVERY DAY 90 tablet 1     No current facility-administered medications for this visit.   [2]   Social History  Socioeconomic History    Marital status: Single   Tobacco Use    Smoking status: Never    Smokeless tobacco: Never   Substance and Sexual Activity    Alcohol use: Yes     Comment: occ    Drug use: No    Sexual activity: Yes      Partners: Female     Social Drivers of Health     Financial Resource Strain: Medium Risk (5/15/2025)    Overall Financial Resource Strain (CARDIA)     Difficulty of Paying Living Expenses: Somewhat hard   Food Insecurity: Food Insecurity Present (5/15/2025)    Hunger Vital Sign     Worried About Running Out of Food in the Last Year: Sometimes true     Ran Out of Food in the Last Year: Never true   Transportation Needs: No Transportation Needs (5/15/2025)    PRAPARE - Transportation     Lack of Transportation (Medical): No     Lack of Transportation (Non-Medical): No   Physical Activity: Inactive (5/15/2025)    Exercise Vital Sign     Days of Exercise per Week: 0 days     Minutes of Exercise per Session: 0 min   Stress: Stress Concern Present (5/15/2025)    [facility] - Occupational Stress Questionnaire     Feeling of Stress : Very much   Housing Stability: High Risk (5/15/2025)    Housing Stability Vital Sign     Unable to Pay for Housing in the Last Year: Yes     Homeless in the Last Year: No

## 2025-05-15 NOTE — PROGRESS NOTES
Subjective:       Patient ID: Tomi Herrera is a 35 y.o. male.    Chief Complaint: Follow-up    Lab Results   Component Value Date    WBC 4.67 08/30/2023    HGB 14.8 08/30/2023    HCT 46.4 08/30/2023     08/30/2023    CHOL 179 08/30/2023    TRIG 31 08/30/2023    HDL 52 08/30/2023    ALT 24 08/30/2023    AST 26 08/30/2023     08/30/2023    K 3.9 08/30/2023     08/30/2023    CREATININE 1.0 08/30/2023    BUN 14 08/30/2023    CO2 26 08/30/2023    TSH 2.37 12/12/2018    PSA 0.35 11/16/2019         History of Present Illness    CHIEF COMPLAINT:  Mr. Herrera presents with concerns about erectile dysfunction and reports rectal discomfort, possibly related to hemorrhoids or rectal prolapse.    HPI:  Mr. Herrera reports erectile dysfunction (ED) and has been taking Cialis 5mg as needed, which has been ineffective in the past week. He describes achieving erections that are not as strong or long-lasting as previously, with inconsistent morning erections. He reports no issues with performance itself but is concerned about the strength of his erections.    Mr. Herrera describes rectal discomfort, stating that after each bowel movement, he needs to manually reposition the tissue. This symptom has been ongoing for some months or years, possibly starting in his teenage years. He reports constipation rather than loose stools. Mr. Herrera mentions a previous incident of blood in the toilet, though he did not seek medical attention at the time.    Mr. Herrera complains of persistent eye twitching affecting his face. He also reports noticing something at the bottom inside of his eye that causes blurry vision.    Mr. Herrera mentions financial stress as a potential contributing factor to his symptoms, relating to bills and attempting to manage multiple tasks simultaneously. He denies relationship stress as a cause.    Mr. Herrera denies coronary blockages, vascular disease, hypertension, and suicidal thoughts. He denies any  chronic medical problems other than possible anxiety and ED.    MEDICATIONS:  Mr. Herrera is on Cialis 5 mg as needed for erectile dysfunction, but he reports it is not effective. He has been prescribed an herbal supplement (Viento) for testosterone boosting, which he has not yet started taking.    MEDICAL HISTORY:  Mr. Herrera has a history of anxiety, erectile dysfunction, and rectal prolapse/hemorrhoids. He experienced a prostate infection in 2019. He also has a pterygium in his left eye.    TEST RESULTS:  Mr. Herrera's PSA test in 2019 was normal. His cholesterol levels were checked in August 2023 and were reported as great.    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    Social History:   Social History[2]        Objective:     Vitals:    05/15/25 0956   BP: 108/84   Pulse: 63   Resp: 17   Temp: 98.7 °F (37.1 °C)        Physical Exam    General: No acute distress. Well-developed. Well-nourished.  Eyes: EOMI. Sclerae anicteric. No restriction in peripheral vision. Pterygium on the left eye barely over the cornea.  HENT: Normocephalic. Atraumatic. Nares patent. Moist oral mucosa.  Cardiovascular: Regular rate. Regular rhythm. No murmurs. No rubs. No gallops. Normal S1, S2. Heart regular. Capillary refill is adequate. Peripheral pulses intact.  Respiratory: Normal respiratory effort. Clear to auscultation bilaterally. No rales. No rhonchi. No wheezing.  Musculoskeletal: No  obvious deformity.  Extremities: No lower extremity edema.  Neurological: Alert & oriented x3. No  slurred speech. Normal gait.  Psychiatric: Normal mood. Normal affect. Good insight. Good judgment.  Skin: Warm. Dry. No rash.  Neck: No thyroid appreciable.            Assessment:       1. Anxiety    2. Preventative health care    3. Erectile dysfunction, unspecified erectile dysfunction type    4. Hemorrhoid prolapse        Plan:     1. Anxiety  -     T4; Future; Expected date: 05/15/2025  -     TSH; Future; Expected date: 05/15/2025    2. Preventative health care  -     Lipid Panel; Future; Expected date: 05/15/2025  -     CBC Auto Differential; Future; Expected date: 05/15/2025  -     Comprehensive Metabolic Panel; Future; Expected date: 05/15/2025  -     Hemoglobin A1C; Future; Expected date: 05/15/2025  -     ergocalciferol (ERGOCALCIFEROL) 50,000 unit Cap; Take 1 capsule (50,000 Units total) by mouth every 7 days.  Dispense: 4 capsule; Refill: 11  -     Vitamin D; Future; Expected date: 08/15/2025    3. Erectile dysfunction, unspecified erectile dysfunction type  -     tadalafiL (CIALIS) 20 MG Tab; Take 1 tablet (20 mg total) by mouth once daily.  Dispense: 18 tablet; Refill: 11    4. Hemorrhoid prolapse  -     Ambulatory referral/consult to Gastroenterology; Future; Expected date: 05/22/2025    5. Erectile dysfunction due to diseases classified elsewhere    6. Anxiety about treatment    7. Major depressive disorder, single episode, moderate  Assessment & Plan:  Patient has anxiety which is contributing to his ED and stress      Other orders  -     polycarbophil (FIBERCON) 625 mg tablet; Take 1 tablet (625 mg total) by mouth once daily.  Dispense: 90 tablet; Refill: 3        Assessment & Plan    N52.1 Erectile dysfunction due to diseases classified elsewhere  F41.9 Anxiety disorder, unspecified  K62.3 Rectal prolapse  K59.00 Constipation, unspecified  H11.002 Unspecified pterygium of left eye  G24.5 Blepharospasm  Z87.430 Personal history of prostatic dysplasia    ERECTILE DYSFUNCTION:  - Increased Cialis  dosage from 5 mg to 20 mg as needed for erectile dysfunction.  - Mr. Herrera reports erections that are not strong enough and do not last as long as they used to.  - Ruled out vascular causes based on normal cholesterol and blood pressure from August 2023 lab work.  - Explained Cialis's 36-hour half-life and recommended use as needed.    ANXIETY DISORDER:  - Mr. Herrera reports stress related to financial issues and relationship pressures, which may contribute to anxiety and potentially to erectile dysfunction.  - Assessed potential serotonin imbalance due to stress and internalization of issues.  - Discussed Zoloft as a potential treatment for anxiety and twitching symptoms, but patient declined medication.  - Suggested stress reduction techniques to help manage symptoms.    RECTAL PROLAPSE:  - Mr. Herrera reports needing to push rectum back in after using the bathroom.  - Symptoms suggest possible hemorrhoids or rectal prolapse.  - Referred to gastroenterologist for evaluation of questionable rectal prolapse.  - Recommend FiberCon to soften stool and prevent rectal prolapse.    CONSTIPATION:  - Mr. Herrera reports solid stools and constipation, requiring rectal adjustment after bowel movements.  - Recommend increasing fiber intake to address constipation and rectal symptoms.  - Prescribed FiberCon for daily use to soften stool and alleviate constipation.    PTERYGIUM OF LEFT EYE:  - Evaluated pterygium on left eye, which is barely over the cornea with no restriction in peripheral vision.  - Condition is normal and does not currently affect vision.  - Educated patient on pterygium and potential need for removal if vision becomes affected.  - Recommend follow-up with an ophthalmologist for evaluation.    BLEPHAROSPASM:  - Mr. Herrera reports eye twitching (blepharospasm) occurring all day.  - Assessed as likely stress-related and potentially connected to serotonin imbalance, which may also relate to anxiety  symptoms.    HISTORY OF PROSTATIC DYSPLASIA:  - Reviewed patient's history of prostate infection.  - Evaluated past PSA levels from 2019, which were normal despite previous prostate infection.  - Determined no need for current PSA check based on previous normal results.    LABS AND FOLLOW-UP:  - Ordered comprehensive lab work including CBC, lipid panel, chemistry panel, diabetes screening test, Vitamin D level, and thyroid function tests.  - Follow up in 6 months for routine annual visit.  - Mr. Herrera advised to contact the office if any abnormal lab results require immediate attention.        Tomi was seen today for follow-up.    Diagnoses and all orders for this visit:    Anxiety  -     T4; Future  -     TSH; Future    Preventative health care  -     Lipid Panel; Future  -     CBC Auto Differential; Future  -     Comprehensive Metabolic Panel; Future  -     Hemoglobin A1C; Future  -     ergocalciferol (ERGOCALCIFEROL) 50,000 unit Cap; Take 1 capsule (50,000 Units total) by mouth every 7 days.  -     Vitamin D; Future    Erectile dysfunction, unspecified erectile dysfunction type  -     tadalafiL (CIALIS) 20 MG Tab; Take 1 tablet (20 mg total) by mouth once daily.    Hemorrhoid prolapse  -     Ambulatory referral/consult to Gastroenterology; Future    Other orders  -     polycarbophil (FIBERCON) 625 mg tablet; Take 1 tablet (625 mg total) by mouth once daily.         Follow up in about 6 months (around 11/15/2025) for annual.  This note was generated with the assistance of ambient listening technology. Verbal consent was obtained by the patient and accompanying visitor(s) for the recording of patient appointment to facilitate this note. I attest to having reviewed and edited the generated note for accuracy, though some syntax or spelling errors may persist. Please contact the author of this note for any clarification.            [1]   Current Outpatient Medications   Medication Sig Dispense Refill     ergocalciferol (ERGOCALCIFEROL) 50,000 unit Cap Take 1 capsule (50,000 Units total) by mouth every 7 days. 4 capsule 11    polycarbophil (FIBERCON) 625 mg tablet Take 1 tablet (625 mg total) by mouth once daily. 90 tablet 3    tadalafiL (CIALIS) 20 MG Tab Take 1 tablet (20 mg total) by mouth once daily. 18 tablet 11     No current facility-administered medications for this visit.   [2]   Social History  Socioeconomic History    Marital status: Single   Tobacco Use    Smoking status: Never    Smokeless tobacco: Never   Substance and Sexual Activity    Alcohol use: Yes     Comment: occ    Drug use: No    Sexual activity: Yes     Partners: Female     Social Drivers of Health     Financial Resource Strain: Medium Risk (5/15/2025)    Overall Financial Resource Strain (CARDIA)     Difficulty of Paying Living Expenses: Somewhat hard   Food Insecurity: Food Insecurity Present (5/15/2025)    Hunger Vital Sign     Worried About Running Out of Food in the Last Year: Sometimes true     Ran Out of Food in the Last Year: Never true   Transportation Needs: No Transportation Needs (5/15/2025)    PRAPARE - Transportation     Lack of Transportation (Medical): No     Lack of Transportation (Non-Medical): No   Physical Activity: Inactive (5/15/2025)    Exercise Vital Sign     Days of Exercise per Week: 0 days     Minutes of Exercise per Session: 0 min   Stress: Stress Concern Present (5/15/2025)    [employer] - Occupational Stress Questionnaire     Feeling of Stress : Very much   Housing Stability: High Risk (5/15/2025)    Housing Stability Vital Sign     Unable to Pay for Housing in the Last Year: Yes     Homeless in the Last Year: No

## 2025-05-16 ENCOUNTER — RESULTS FOLLOW-UP (OUTPATIENT)
Dept: FAMILY MEDICINE | Facility: CLINIC | Age: 35
End: 2025-05-16

## 2025-05-16 ENCOUNTER — TELEPHONE (OUTPATIENT)
Dept: FAMILY MEDICINE | Facility: CLINIC | Age: 35
End: 2025-05-16
Payer: COMMERCIAL

## 2025-05-16 DIAGNOSIS — E55.9 VITAMIN D DEFICIENCY: Primary | ICD-10-CM

## 2025-05-16 RX ORDER — ERGOCALCIFEROL 1.25 MG/1
50000 CAPSULE ORAL
Qty: 4 CAPSULE | Refills: 11 | Status: SHIPPED | OUTPATIENT
Start: 2025-05-16 | End: 2026-05-16

## 2025-05-16 NOTE — TELEPHONE ENCOUNTER
----- Message from Jaguar Voss MD sent at 5/16/2025  8:15 AM CDT -----  Vitamin-D level is slightly low recommend vitamin-D supplementation weekly and recheck the level in 6 months I will place orders.  ----- Message -----  From: Lab, Background User  Sent: 5/15/2025   7:19 PM CDT  To: Jaguar Voss MD

## 2025-05-20 ENCOUNTER — TELEPHONE (OUTPATIENT)
Dept: FAMILY MEDICINE | Facility: CLINIC | Age: 35
End: 2025-05-20
Payer: COMMERCIAL

## 2025-05-20 NOTE — TELEPHONE ENCOUNTER
Ergocalciferol PA Closed    Closed  Prior Authorization Portal   Close reason: Other  Payer: Auto [employer] Patient's Payer Case ID: [id number]    [phone]  [facility] error with your request. Mismatch in:LastName ePA Initiation Request has:SMITH VS SHANTAL has: TONY SR please update and resubmit if this is the correct patient. Please reach out to the payer for assistance.  View History

## 2025-06-02 ENCOUNTER — PATIENT MESSAGE (OUTPATIENT)
Dept: FAMILY MEDICINE | Facility: CLINIC | Age: 35
End: 2025-06-02
Payer: COMMERCIAL

## 2025-07-03 ENCOUNTER — PATIENT MESSAGE (OUTPATIENT)
Dept: FAMILY MEDICINE | Facility: CLINIC | Age: 35
End: 2025-07-03
Payer: COMMERCIAL

## 2025-07-23 ENCOUNTER — OFFICE VISIT (OUTPATIENT)
Dept: FAMILY MEDICINE | Facility: CLINIC | Age: 35
End: 2025-07-23
Payer: COMMERCIAL

## 2025-07-23 ENCOUNTER — PATIENT MESSAGE (OUTPATIENT)
Dept: FAMILY MEDICINE | Facility: CLINIC | Age: 35
End: 2025-07-23

## 2025-07-23 VITALS
BODY MASS INDEX: 23.98 KG/M2 | HEIGHT: 67 IN | TEMPERATURE: 98 F | OXYGEN SATURATION: 100 % | WEIGHT: 152.75 LBS | HEART RATE: 79 BPM | DIASTOLIC BLOOD PRESSURE: 81 MMHG | SYSTOLIC BLOOD PRESSURE: 124 MMHG

## 2025-07-23 DIAGNOSIS — R09.89 LABILE BLOOD PRESSURE: ICD-10-CM

## 2025-07-23 DIAGNOSIS — N52.1 ERECTILE DYSFUNCTION DUE TO DISEASES CLASSIFIED ELSEWHERE: Primary | ICD-10-CM

## 2025-07-23 PROBLEM — I51.7 ATRIAL ENLARGEMENT, LEFT: Status: RESOLVED | Noted: 2023-08-30 | Resolved: 2025-07-23

## 2025-07-23 PROCEDURE — 99999 PR PBB SHADOW E&M-EST. PATIENT-LVL IV: CPT | Mod: PBBFAC,,, | Performed by: FAMILY MEDICINE

## 2025-07-23 RX ORDER — TADALAFIL 5 MG/1
5 TABLET ORAL
COMMUNITY
Start: 2025-05-15

## 2025-07-23 RX ORDER — MELOXICAM 15 MG/1
15 TABLET ORAL
COMMUNITY
Start: 2025-06-04

## 2025-07-23 RX ORDER — MAGNESIUM 250 MG
1 TABLET ORAL NIGHTLY
Qty: 90 TABLET | Refills: 3 | Status: SHIPPED | OUTPATIENT
Start: 2025-07-23

## 2025-07-23 NOTE — PATIENT INSTRUCTIONS
Zoltan Krishna,     If you are due for any health screening(s) below please notify me so we can arrange them to be ordered and scheduled. Most healthy patients at your age complete them, but you are free to accept or refuse.     If you can't do it, I'll definitely understand. If you can, I'd certainly appreciate it!    All of your core healthy metrics are met.

## 2025-07-23 NOTE — PROGRESS NOTES
Subjective:       Patient ID: Tomi Herrera is a 35 y.o. male.    Chief Complaint: Hypertension      History of Present Illness    CHIEF COMPLAINT:  Mr. Herrera presents with concerns about fluctuating blood pressure readings at home and intermittent erectile dysfunction.    HPI:  Mr. Herrera reports elevated blood pressure readings when measuring at home, with values reaching 143/100, even in the early morning around 6:26 AM shortly after waking up. His blood pressure tends to be higher more often than normal when measured at home. He recalls a specific instance when he had chest pain due to acid reflux and his blood pressure was similarly elevated (140 something over 100) at the doctor's office before going to the hospital. At the hospital, his blood pressure normalized.    Mr. Herrera describes intermittent issues with erectile function. His penis sometimes decreases in size unexpectedly, comparing it to the effect of cold temperature. This occurs about 10% of the time, with normal function 90% of the time. These issues seem to fluctuate, with periods of normal function followed by periods of dysfunction. He denies having morning erections. He reports being able to maintain an erection most of the time when sexually active and has no issues with ejaculation. He has monitored this issue, noticing that for one week he had normal function every day, followed by a week with no normal function at all.    He mentions a rectal issue, describing a prolapse that he needs to manually reduce after using the bathroom. He has taken a photo of this condition to show the doctor.    He reports having a 7-year-old son and being sexually active, indicating no issues with fertility.    He denies having hypertension, diabetes, or being on any antidepressants.    MEDICATIONS:  Mr. Herrera is on Cialis as needed for erectile dysfunction.    MEDICAL HISTORY:  Mr. Herrera has a history of acid reflux. He was previously diagnosed with left  atrial enlargement, but this was later determined to be a mistake or inaccurate diagnosis.    FAMILY HISTORY:  Family history is significant for father with high blood pressure.    TEST RESULTS:  Mr. Herrera's cholesterol level is high at 201, while triglycerides are good. His LDL has increased from 113 in 2019 to 131. Total cholesterol has also increased since 2019. He underwent a stress test two years ago. An EKG suggested left atrial enlargement, which was later determined to be inaccurate.    IMAGING:  Mr. Herrera had an echocardiogram two years ago, which showed good EF, normal ventricle, normal wall thickness, and a normal left atrium.    SOCIAL HISTORY:  Denies smoking Occupation: Works at [employer]      ROS:  Male Genitourinary: +erectile dysfunction        Lab Results   Component Value Date    WBC 4.12 05/15/2025    HGB 15.3 05/15/2025    HCT 48.9 05/15/2025     05/15/2025    CHOL 201 (H) 05/15/2025    TRIG 66 05/15/2025    HDL 56 05/15/2025    ALT 34 05/15/2025    AST 24 05/15/2025     05/15/2025    K 4.6 05/15/2025     05/15/2025    CREATININE 0.9 05/15/2025    BUN 11 05/15/2025    CO2 27 05/15/2025    TSH 0.862 05/15/2025    PSA 0.35 11/16/2019    HGBA1C 5.4 05/15/2025     Lab Results   Component Value Date    CHOL 201 (H) 05/15/2025    CHOL 179 08/30/2023    CHOL 174 11/16/2019      Lab Results   Component Value Date    HDL 56 05/15/2025    HDL 52 08/30/2023    HDL 50 11/16/2019     Lab Results   Component Value Date    LDLCALC 131.8 05/15/2025    LDLCALC 120.8 08/30/2023    LDLCALC 113 (H) 11/16/2019      Lab Results   Component Value Date    TRIG 66 05/15/2025    TRIG 31 08/30/2023    TRIG 56 11/16/2019     Lab Results   Component Value Date    TOTALCHOLEST 3.6 05/15/2025    TOTALCHOLEST 3.4 08/30/2023    TOTALCHOLEST 3.5 11/16/2019     Lab Results   Component Value Date    NONHDLCHOL 145 05/15/2025    NONHDLCHOL 127 08/30/2023    NONHDLCHOL 124 11/16/2019     Lab Results   Component  Value Date    CHOLHDL 27.9 05/15/2025    CHOLHDL 29.1 08/30/2023    CHOLHDL 28.7 11/16/2019         Allergies and Medications:   Review of patient's allergies indicates:  No Known Allergies  Current Medications[1]    Family History:   No family history on file.    Social History:   Social History[2]  BP Readings from Last 3 Encounters:   07/23/25 124/81   07/03/25 138/85   05/15/25 108/84           Objective:     Vitals:    07/23/25 1437   BP: 124/81   Pulse: 79   Temp: 98.4 °F (36.9 °C)        Physical Exam    Vitals: Blood pressure: 124/81.  General: No acute distress. Well-developed. Well-nourished.  Eyes: EOMI. Sclerae anicteric.  HENT: Normocephalic. Atraumatic. Nares patent. Moist oral mucosa.  Cardiovascular: Regular rate. Regular rhythm. No murmurs. No rubs. No gallops. Normal S1, S2. PMI in left midclavicular line.  Respiratory: Normal respiratory effort. Clear to auscultation bilaterally. No rales. No rhonchi. No wheezing.  Musculoskeletal: No  obvious deformity.  Extremities: No lower extremity edema.  Neurological: Alert & oriented x3. No slurred speech. Normal gait.  Psychiatric: Normal mood. Normal affect. Good insight. Good judgment.  Skin: Warm. Dry. No rash.            Assessment:       1. Erectile dysfunction due to diseases classified elsewhere    2. Labile blood pressure        Plan:       Assessment & Plan    N52.1 Erectile dysfunction due to diseases classified elsewhere  E78.5 Hyperlipidemia, unspecified  K64.8 Other hemorrhoids  K21.9 Gastro-esophageal reflux disease without esophagitis  Z82.49 Family history of ischemic heart disease and other diseases of the circulatory system    ERECTILE DYSFUNCTION:  - Evaluated patient's reported erectile dysfunction symptoms, including penile shrinkage and absence of morning erections.  - Determined the condition is likely psychological and stress-related, as patient is not on medications that would cause it and does not have hypertension or  diabetes.  - Discussed the brain's role in suppressing erections during stress.  - Noted that high blood pressure can potentially interfere with achieving erections.  - Mr. Herrera was previously prescribed Cialis.  - Referred to Dr. Roberto (urologist) for further evaluation.    HYPERLIPIDEMIA:  - Noted LDL cholesterol increased from 113 in 2019 to 131, with total cholesterol now at 201.  - Triglycerides remain within normal limits.  - Recommend implementing a low cholesterol diet before considering pharmacological intervention.  - Will reassess lipid profile at next follow-up visit.    HEMORRHOIDS:  - Identified prolapsed internal hemorrhoid based on patient's description and photo.  - Mr. Herrera reports needing manual reduction after bowel movements.  - Discussed that this condition can both cause and be exacerbated by constipation.  - Referred to Dr. Roberto (urologist) for evaluation of rectal prolapse.    GASTROESOPHAGEAL REFLUX DISEASE:  - Mr. Herrera reports chest pain related to acid reflux, with blood pressure readings of 143/100 during these episodes.    FAMILY HISTORY OF CARDIOVASCULAR DISEASE:  - Documented family history of hypertension, specifically in patient's father.    BLOOD PRESSURE MANAGEMENT:  - Considered increasing BP medication due to reported high home readings, but opted for conservative approach given normal in-office measurements.  - Mr. Herrera to bring home BP monitor to office for comparison with office equipment.  - Started magnesium 250 mg at night as first-line intervention for labile BP, aiming to prevent spikes without risking hypotension.  - Explained that magnesium acts as a smooth muscle relaxer and can help stabilize BP by counteracting sodium effects.    FOLLOW-UP:  - Advised patient to schedule an appointment for a previously missed referral.        Tomi was seen today for hypertension.    Diagnoses and all orders for this visit:    Erectile dysfunction due to diseases classified  elsewhere  -     Ambulatory referral/consult to Urology; Future    Labile blood pressure  -     magnesium 250 mg Tab; Take 1 tablet (250 mg total) by mouth every evening.         No follow-ups on file.  This note was generated with the assistance of ambient listening technology. Verbal consent was obtained by the patient and accompanying visitor(s) for the recording of patient appointment to facilitate this note. I attest to having reviewed and edited the generated note for accuracy, though some syntax or spelling errors may persist. Please contact the author of this note for any clarification.            [1]   Current Outpatient Medications   Medication Sig Dispense Refill    ergocalciferol (ERGOCALCIFEROL) 50,000 unit Cap Take 1 capsule (50,000 Units total) by mouth every 7 days. 4 capsule 11    meloxicam (MOBIC) 15 MG tablet Take 15 mg by mouth.      tadalafiL (CIALIS) 5 MG tablet Take 5 mg by mouth.      magnesium 250 mg Tab Take 1 tablet (250 mg total) by mouth every evening. 90 tablet 3    tadalafiL (CIALIS) 20 MG Tab Take 1 tablet (20 mg total) by mouth once daily. 18 tablet 11     No current facility-administered medications for this visit.   [2]   Social History  Socioeconomic History    Marital status: Single   Tobacco Use    Smoking status: Never    Smokeless tobacco: Never   Substance and Sexual Activity    Alcohol use: Yes     Comment: occ    Drug use: No    Sexual activity: Yes     Partners: Female     Social Drivers of Health     Financial Resource Strain: Medium Risk (5/15/2025)    Overall Financial Resource Strain (CARDIA)     Difficulty of Paying Living Expenses: Somewhat hard   Food Insecurity: Food Insecurity Present (5/15/2025)    Hunger Vital Sign     Worried About Running Out of Food in the Last Year: Sometimes true     Ran Out of Food in the Last Year: Never true   Transportation Needs: No Transportation Needs (5/15/2025)    PRAPARE - Transportation     Lack of Transportation (Medical): No      Lack of Transportation (Non-Medical): No   Physical Activity: Inactive (5/15/2025)    Exercise Vital Sign     Days of Exercise per Week: 0 days     Minutes of Exercise per Session: 0 min   Stress: Stress Concern Present (5/15/2025)    [employer] [employer] - Occupational Stress Questionnaire     Feeling of Stress : Very much   Housing Stability: High Risk (5/15/2025)    Housing Stability Vital Sign     Unable to Pay for Housing in the Last Year: Yes     Homeless in the Last Year: No

## 2025-07-25 ENCOUNTER — TELEPHONE (OUTPATIENT)
Dept: FAMILY MEDICINE | Facility: CLINIC | Age: 35
End: 2025-07-25
Payer: COMMERCIAL

## 2025-07-26 ENCOUNTER — PATIENT MESSAGE (OUTPATIENT)
Dept: FAMILY MEDICINE | Facility: CLINIC | Age: 35
End: 2025-07-26
Payer: COMMERCIAL

## 2025-08-10 DIAGNOSIS — N52.9 MALE ERECTILE DYSFUNCTION, UNSPECIFIED: ICD-10-CM

## 2025-08-11 RX ORDER — TADALAFIL 5 MG/1
5 TABLET ORAL
Qty: 90 TABLET | Refills: 1 | OUTPATIENT
Start: 2025-08-11

## 2025-08-12 ENCOUNTER — TELEPHONE (OUTPATIENT)
Dept: ORTHOPEDICS | Facility: CLINIC | Age: 35
End: 2025-08-12
Payer: COMMERCIAL

## 2025-08-12 ENCOUNTER — OFFICE VISIT (OUTPATIENT)
Dept: ORTHOPEDICS | Facility: CLINIC | Age: 35
End: 2025-08-12
Payer: COMMERCIAL

## 2025-08-12 ENCOUNTER — HOSPITAL ENCOUNTER (OUTPATIENT)
Dept: RADIOLOGY | Facility: OTHER | Age: 35
Discharge: HOME | End: 2025-08-12
Attending: PHYSICIAN ASSISTANT
Payer: COMMERCIAL

## 2025-08-12 VITALS
DIASTOLIC BLOOD PRESSURE: 68 MMHG | BODY MASS INDEX: 25.08 KG/M2 | WEIGHT: 159.81 LBS | SYSTOLIC BLOOD PRESSURE: 126 MMHG | HEART RATE: 60 BPM | HEIGHT: 67 IN

## 2025-08-12 DIAGNOSIS — M77.8 RIGHT WRIST TENDONITIS: Primary | ICD-10-CM

## 2025-08-12 DIAGNOSIS — M77.8 LEFT WRIST TENDONITIS: ICD-10-CM

## 2025-08-12 DIAGNOSIS — M79.641 BILATERAL HAND PAIN: ICD-10-CM

## 2025-08-12 DIAGNOSIS — M79.641 BILATERAL HAND PAIN: Primary | ICD-10-CM

## 2025-08-12 DIAGNOSIS — M79.642 BILATERAL HAND PAIN: Primary | ICD-10-CM

## 2025-08-12 DIAGNOSIS — M79.642 BILATERAL HAND PAIN: ICD-10-CM

## 2025-08-12 PROBLEM — K21.9 GASTROESOPHAGEAL REFLUX DISEASE: Status: ACTIVE | Noted: 2023-04-26

## 2025-08-12 PROBLEM — E66.3 OVERWEIGHT WITH BODY MASS INDEX (BMI) 25.0-29.9: Status: ACTIVE | Noted: 2022-06-28

## 2025-08-12 PROBLEM — K12.30 ORAL MUCOSITIS (ULCERATIVE), UNSPECIFIED: Status: RESOLVED | Noted: 2025-08-12 | Resolved: 2025-08-12

## 2025-08-12 PROCEDURE — 73130 X-RAY EXAM OF HAND: CPT | Mod: 26,,, | Performed by: RADIOLOGY

## 2025-08-12 PROCEDURE — 3074F SYST BP LT 130 MM HG: CPT | Mod: CPTII,S$GLB,, | Performed by: PHYSICIAN ASSISTANT

## 2025-08-12 PROCEDURE — 73130 X-RAY EXAM OF HAND: CPT | Mod: TC,50

## 2025-08-12 PROCEDURE — 99999 PR PBB SHADOW E&M-EST. PATIENT-LVL III: CPT | Mod: PBBFAC,,, | Performed by: PHYSICIAN ASSISTANT

## 2025-08-12 PROCEDURE — 99205 OFFICE O/P NEW HI 60 MIN: CPT | Mod: S$GLB,,, | Performed by: PHYSICIAN ASSISTANT

## 2025-08-12 PROCEDURE — 3078F DIAST BP <80 MM HG: CPT | Mod: CPTII,S$GLB,, | Performed by: PHYSICIAN ASSISTANT

## 2025-08-12 PROCEDURE — 3044F HG A1C LEVEL LT 7.0%: CPT | Mod: CPTII,S$GLB,, | Performed by: PHYSICIAN ASSISTANT

## 2025-08-12 PROCEDURE — 3008F BODY MASS INDEX DOCD: CPT | Mod: CPTII,S$GLB,, | Performed by: PHYSICIAN ASSISTANT

## 2025-08-12 RX ORDER — DICLOFENAC SODIUM 10 MG/G
2 GEL TOPICAL 4 TIMES DAILY
Qty: 150 G | Refills: 0 | Status: SHIPPED | OUTPATIENT
Start: 2025-08-12 | End: 2025-09-11

## 2025-08-19 ENCOUNTER — PATIENT MESSAGE (OUTPATIENT)
Dept: FAMILY MEDICINE | Facility: CLINIC | Age: 35
End: 2025-08-19
Payer: COMMERCIAL

## 2025-08-21 ENCOUNTER — TELEPHONE (OUTPATIENT)
Dept: FAMILY MEDICINE | Facility: CLINIC | Age: 35
End: 2025-08-21
Payer: COMMERCIAL

## 2025-08-22 ENCOUNTER — OFFICE VISIT (OUTPATIENT)
Dept: URGENT CARE | Facility: CLINIC | Age: 35
End: 2025-08-22
Payer: COMMERCIAL

## 2025-08-22 VITALS
TEMPERATURE: 98 F | OXYGEN SATURATION: 99 % | DIASTOLIC BLOOD PRESSURE: 64 MMHG | BODY MASS INDEX: 24.64 KG/M2 | SYSTOLIC BLOOD PRESSURE: 117 MMHG | WEIGHT: 157 LBS | HEIGHT: 67 IN | HEART RATE: 64 BPM | RESPIRATION RATE: 18 BRPM

## 2025-08-22 DIAGNOSIS — G89.29 CHRONIC MIDLINE LOW BACK PAIN WITH LEFT-SIDED SCIATICA: Primary | ICD-10-CM

## 2025-08-22 DIAGNOSIS — M54.42 CHRONIC MIDLINE LOW BACK PAIN WITH LEFT-SIDED SCIATICA: Primary | ICD-10-CM

## 2025-08-22 RX ORDER — NAPROXEN 500 MG/1
500 TABLET ORAL 2 TIMES DAILY
Qty: 10 TABLET | Refills: 0 | Status: SHIPPED | OUTPATIENT
Start: 2025-08-22 | End: 2025-08-27

## 2025-08-25 ENCOUNTER — CLINICAL SUPPORT (OUTPATIENT)
Dept: REHABILITATION | Facility: HOSPITAL | Age: 35
End: 2025-08-25
Payer: COMMERCIAL

## 2025-08-25 ENCOUNTER — OFFICE VISIT (OUTPATIENT)
Dept: FAMILY MEDICINE | Facility: CLINIC | Age: 35
End: 2025-08-25
Payer: COMMERCIAL

## 2025-08-25 VITALS
SYSTOLIC BLOOD PRESSURE: 122 MMHG | BODY MASS INDEX: 24.24 KG/M2 | WEIGHT: 154.75 LBS | HEART RATE: 67 BPM | TEMPERATURE: 98 F | DIASTOLIC BLOOD PRESSURE: 72 MMHG | OXYGEN SATURATION: 99 %

## 2025-08-25 DIAGNOSIS — Z00.00 PREVENTATIVE HEALTH CARE: ICD-10-CM

## 2025-08-25 DIAGNOSIS — E78.2 MIXED HYPERLIPIDEMIA: ICD-10-CM

## 2025-08-25 DIAGNOSIS — R29.898 IMPAIRED FLEXIBILITY OF LOWER EXTREMITY: ICD-10-CM

## 2025-08-25 DIAGNOSIS — F32.1 MAJOR DEPRESSIVE DISORDER, SINGLE EPISODE, MODERATE: ICD-10-CM

## 2025-08-25 DIAGNOSIS — R29.898 DECREASED STRENGTH OF LOWER EXTREMITY: Primary | ICD-10-CM

## 2025-08-25 DIAGNOSIS — E55.9 VITAMIN D DEFICIENCY: ICD-10-CM

## 2025-08-25 DIAGNOSIS — M79.7 FIBROMYALGIA: ICD-10-CM

## 2025-08-25 DIAGNOSIS — R09.89 LABILE BLOOD PRESSURE: ICD-10-CM

## 2025-08-25 DIAGNOSIS — R45.89 ANXIETY ABOUT TREATMENT: Primary | ICD-10-CM

## 2025-08-25 PROBLEM — R06.02 SOB (SHORTNESS OF BREATH): Status: RESOLVED | Noted: 2023-08-30 | Resolved: 2025-08-25

## 2025-08-25 PROBLEM — E66.3 OVERWEIGHT WITH BODY MASS INDEX (BMI) 25.0-29.9: Status: RESOLVED | Noted: 2022-06-28 | Resolved: 2025-08-25

## 2025-08-25 PROCEDURE — 3078F DIAST BP <80 MM HG: CPT | Mod: CPTII,S$GLB,, | Performed by: FAMILY MEDICINE

## 2025-08-25 PROCEDURE — 99214 OFFICE O/P EST MOD 30 MIN: CPT | Mod: S$GLB,,, | Performed by: FAMILY MEDICINE

## 2025-08-25 PROCEDURE — 1159F MED LIST DOCD IN RCRD: CPT | Mod: CPTII,S$GLB,, | Performed by: FAMILY MEDICINE

## 2025-08-25 PROCEDURE — 3044F HG A1C LEVEL LT 7.0%: CPT | Mod: CPTII,S$GLB,, | Performed by: FAMILY MEDICINE

## 2025-08-25 PROCEDURE — 97161 PT EVAL LOW COMPLEX 20 MIN: CPT | Mod: PN | Performed by: PHYSICAL THERAPIST

## 2025-08-25 PROCEDURE — 3008F BODY MASS INDEX DOCD: CPT | Mod: CPTII,S$GLB,, | Performed by: FAMILY MEDICINE

## 2025-08-25 PROCEDURE — 99999 PR PBB SHADOW E&M-EST. PATIENT-LVL III: CPT | Mod: PBBFAC,,, | Performed by: FAMILY MEDICINE

## 2025-08-25 PROCEDURE — 97530 THERAPEUTIC ACTIVITIES: CPT | Mod: PN | Performed by: PHYSICAL THERAPIST

## 2025-08-25 PROCEDURE — 3074F SYST BP LT 130 MM HG: CPT | Mod: CPTII,S$GLB,, | Performed by: FAMILY MEDICINE

## 2025-08-27 ENCOUNTER — LAB VISIT (OUTPATIENT)
Dept: LAB | Facility: HOSPITAL | Age: 35
End: 2025-08-27
Payer: COMMERCIAL

## 2025-08-27 ENCOUNTER — PATIENT MESSAGE (OUTPATIENT)
Dept: FAMILY MEDICINE | Facility: CLINIC | Age: 35
End: 2025-08-27
Payer: COMMERCIAL

## 2025-08-27 ENCOUNTER — CLINICAL SUPPORT (OUTPATIENT)
Dept: REHABILITATION | Facility: HOSPITAL | Age: 35
End: 2025-08-27
Payer: COMMERCIAL

## 2025-08-27 ENCOUNTER — OFFICE VISIT (OUTPATIENT)
Dept: UROLOGY | Facility: CLINIC | Age: 35
End: 2025-08-27
Payer: COMMERCIAL

## 2025-08-27 VITALS — HEIGHT: 67 IN | BODY MASS INDEX: 24.6 KG/M2 | WEIGHT: 156.75 LBS

## 2025-08-27 DIAGNOSIS — N52.01 ERECTILE DYSFUNCTION DUE TO ARTERIAL INSUFFICIENCY: Primary | ICD-10-CM

## 2025-08-27 DIAGNOSIS — N52.01 ERECTILE DYSFUNCTION DUE TO ARTERIAL INSUFFICIENCY: ICD-10-CM

## 2025-08-27 DIAGNOSIS — R29.898 DECREASED STRENGTH OF LOWER EXTREMITY: ICD-10-CM

## 2025-08-27 DIAGNOSIS — M79.7 FIBROMYALGIA: ICD-10-CM

## 2025-08-27 DIAGNOSIS — R29.898 IMPAIRED FLEXIBILITY OF LOWER EXTREMITY: ICD-10-CM

## 2025-08-27 DIAGNOSIS — Z00.00 PREVENTATIVE HEALTH CARE: Primary | ICD-10-CM

## 2025-08-27 LAB — TESTOST SERPL-MCNC: 750 NG/DL (ref 304–1227)

## 2025-08-27 PROCEDURE — 36415 COLL VENOUS BLD VENIPUNCTURE: CPT | Mod: PO

## 2025-08-27 PROCEDURE — 84403 ASSAY OF TOTAL TESTOSTERONE: CPT

## 2025-08-27 PROCEDURE — 97112 NEUROMUSCULAR REEDUCATION: CPT | Mod: PN,CQ

## 2025-08-27 PROCEDURE — 83001 ASSAY OF GONADOTROPIN (FSH): CPT

## 2025-08-27 PROCEDURE — 97110 THERAPEUTIC EXERCISES: CPT | Mod: PN,CQ

## 2025-08-27 PROCEDURE — 84146 ASSAY OF PROLACTIN: CPT

## 2025-08-27 PROCEDURE — 97530 THERAPEUTIC ACTIVITIES: CPT | Mod: PN,CQ

## 2025-08-27 PROCEDURE — 82672 ASSAY OF ESTROGEN: CPT

## 2025-08-27 PROCEDURE — 99999 PR PBB SHADOW E&M-EST. PATIENT-LVL III: CPT | Mod: PBBFAC,,,

## 2025-08-27 PROCEDURE — 97140 MANUAL THERAPY 1/> REGIONS: CPT | Mod: PN,CQ

## 2025-08-27 PROCEDURE — 83002 ASSAY OF GONADOTROPIN (LH): CPT

## 2025-08-27 RX ORDER — CHOLECALCIFEROL (VITAMIN D3) 25 MCG
1000 TABLET ORAL DAILY
COMMUNITY

## 2025-08-28 ENCOUNTER — TELEPHONE (OUTPATIENT)
Dept: UROLOGY | Facility: CLINIC | Age: 35
End: 2025-08-28
Payer: COMMERCIAL

## 2025-08-28 ENCOUNTER — PATIENT MESSAGE (OUTPATIENT)
Dept: UROLOGY | Facility: CLINIC | Age: 35
End: 2025-08-28
Payer: COMMERCIAL

## 2025-08-28 DIAGNOSIS — R79.89 ABNORMAL FSH LEVEL: Primary | ICD-10-CM

## 2025-08-28 LAB
FSH SERPL-ACNC: 15.9 MIU/ML (ref 0.95–11.95)
LH SERPL-ACNC: 9.4 MIU/ML (ref 0.6–12.1)
PROLACTIN SERPL IA-MCNC: 9.5 NG/ML (ref 3.5–19.4)

## 2025-09-01 PROBLEM — Z00.00 PREVENTATIVE HEALTH CARE: Status: RESOLVED | Noted: 2023-08-30 | Resolved: 2025-09-01

## 2025-09-03 ENCOUNTER — CLINICAL SUPPORT (OUTPATIENT)
Dept: REHABILITATION | Facility: HOSPITAL | Age: 35
End: 2025-09-03
Payer: COMMERCIAL

## 2025-09-03 ENCOUNTER — OFFICE VISIT (OUTPATIENT)
Facility: CLINIC | Age: 35
End: 2025-09-03
Payer: COMMERCIAL

## 2025-09-03 ENCOUNTER — PATIENT MESSAGE (OUTPATIENT)
Dept: FAMILY MEDICINE | Facility: CLINIC | Age: 35
End: 2025-09-03
Payer: COMMERCIAL

## 2025-09-03 VITALS
WEIGHT: 153.25 LBS | HEIGHT: 67 IN | BODY MASS INDEX: 24.05 KG/M2 | SYSTOLIC BLOOD PRESSURE: 131 MMHG | DIASTOLIC BLOOD PRESSURE: 87 MMHG | HEART RATE: 80 BPM

## 2025-09-03 DIAGNOSIS — R20.2 TINGLING OF SKIN: Primary | ICD-10-CM

## 2025-09-03 DIAGNOSIS — R25.3 BENIGN FASCICULATIONS: ICD-10-CM

## 2025-09-03 DIAGNOSIS — M79.7 FIBROMYALGIA: Primary | ICD-10-CM

## 2025-09-03 DIAGNOSIS — R29.898 DECREASED STRENGTH OF LOWER EXTREMITY: ICD-10-CM

## 2025-09-03 DIAGNOSIS — R29.898 IMPAIRED FLEXIBILITY OF LOWER EXTREMITY: ICD-10-CM

## 2025-09-03 PROCEDURE — 1159F MED LIST DOCD IN RCRD: CPT | Mod: CPTII,S$GLB,,

## 2025-09-03 PROCEDURE — 99999 PR PBB SHADOW E&M-EST. PATIENT-LVL III: CPT | Mod: PBBFAC,,,

## 2025-09-03 PROCEDURE — 97112 NEUROMUSCULAR REEDUCATION: CPT | Mod: PN,CQ

## 2025-09-03 PROCEDURE — 97110 THERAPEUTIC EXERCISES: CPT | Mod: PN,CQ

## 2025-09-03 PROCEDURE — 3079F DIAST BP 80-89 MM HG: CPT | Mod: CPTII,S$GLB,,

## 2025-09-03 PROCEDURE — 3044F HG A1C LEVEL LT 7.0%: CPT | Mod: CPTII,S$GLB,,

## 2025-09-03 PROCEDURE — 3075F SYST BP GE 130 - 139MM HG: CPT | Mod: CPTII,S$GLB,,

## 2025-09-03 PROCEDURE — 99204 OFFICE O/P NEW MOD 45 MIN: CPT | Mod: S$GLB,,,

## 2025-09-03 PROCEDURE — 3008F BODY MASS INDEX DOCD: CPT | Mod: CPTII,S$GLB,,

## 2025-09-03 PROCEDURE — 97530 THERAPEUTIC ACTIVITIES: CPT | Mod: PN,CQ

## 2025-09-04 ENCOUNTER — OFFICE VISIT (OUTPATIENT)
Dept: ENDOCRINOLOGY | Facility: CLINIC | Age: 35
End: 2025-09-04
Payer: COMMERCIAL

## 2025-09-04 VITALS
HEART RATE: 61 BPM | BODY MASS INDEX: 24.24 KG/M2 | TEMPERATURE: 98 F | WEIGHT: 154.44 LBS | OXYGEN SATURATION: 98 % | RESPIRATION RATE: 16 BRPM | HEIGHT: 67 IN | SYSTOLIC BLOOD PRESSURE: 112 MMHG | DIASTOLIC BLOOD PRESSURE: 88 MMHG

## 2025-09-04 DIAGNOSIS — N52.1 ERECTILE DYSFUNCTION DUE TO DISEASES CLASSIFIED ELSEWHERE: Primary | ICD-10-CM

## 2025-09-04 DIAGNOSIS — N50.0 ATROPHY OF BOTH TESTES: ICD-10-CM

## 2025-09-04 LAB
ESTRADIOL: 27 PG/ML
ESTROGEN SERPL-MCNC: 60 PG/ML
ESTRONE PG/ML: 33 PG/ML

## 2025-09-04 PROCEDURE — 99999 PR PBB SHADOW E&M-EST. PATIENT-LVL IV: CPT | Mod: PBBFAC,,, | Performed by: STUDENT IN AN ORGANIZED HEALTH CARE EDUCATION/TRAINING PROGRAM

## 2025-09-05 ENCOUNTER — TELEPHONE (OUTPATIENT)
Dept: FAMILY MEDICINE | Facility: CLINIC | Age: 35
End: 2025-09-05
Payer: COMMERCIAL